# Patient Record
Sex: FEMALE | Race: WHITE | NOT HISPANIC OR LATINO | Employment: UNEMPLOYED | URBAN - NONMETROPOLITAN AREA
[De-identification: names, ages, dates, MRNs, and addresses within clinical notes are randomized per-mention and may not be internally consistent; named-entity substitution may affect disease eponyms.]

---

## 2017-01-13 ENCOUNTER — OFFICE VISIT (OUTPATIENT)
Dept: PULMONOLOGY | Facility: CLINIC | Age: 62
End: 2017-01-13

## 2017-01-13 VITALS
DIASTOLIC BLOOD PRESSURE: 92 MMHG | SYSTOLIC BLOOD PRESSURE: 130 MMHG | BODY MASS INDEX: 30.05 KG/M2 | HEIGHT: 66 IN | WEIGHT: 187 LBS | OXYGEN SATURATION: 96 % | HEART RATE: 81 BPM | TEMPERATURE: 98.6 F

## 2017-01-13 DIAGNOSIS — R91.8 PULMONARY NODULES: ICD-10-CM

## 2017-01-13 DIAGNOSIS — J30.1 SEASONAL ALLERGIC RHINITIS DUE TO POLLEN: Primary | ICD-10-CM

## 2017-01-13 DIAGNOSIS — I27.82 OTHER CHRONIC PULMONARY EMBOLISM WITHOUT ACUTE COR PULMONALE (HCC): ICD-10-CM

## 2017-01-13 PROCEDURE — 99213 OFFICE O/P EST LOW 20 MIN: CPT | Performed by: INTERNAL MEDICINE

## 2017-01-13 NOTE — PROGRESS NOTES
Interval history since last visit:    Recent hospitalizations: none     Investigations: No test ordered.     Have you had the Influenza Vaccine? no   Would you like to receive this Vaccine today? no    Have you had the Pneumonia Vaccine?  no  Would you like to receive this Vaccine today? no    Subjective    Eva Manus presents for the following Allergies and pulmonary nodule  .    History of Present Illness   No interval change in patients history. Shortness of breath and other symptoms remain at baseline.    Did not get vaccination and doesnot want to get any vaccination- even after my counseling.     Review of Systems   Constitutional: Negative for activity change, fatigue and unexpected weight change.   HENT: Negative for congestion, postnasal drip and rhinorrhea.    Respiratory: Negative for apnea, cough, chest tightness, shortness of breath and wheezing.    Cardiovascular: Negative for chest pain and palpitations.   Gastrointestinal: Negative for nausea.   Allergic/Immunologic: Negative for environmental allergies.   Psychiatric/Behavioral: Negative for agitation and confusion.       Active Problems:  Problem List Items Addressed This Visit        Cardiovascular and Mediastinum    Pulmonary embolism       Respiratory    Pulmonary nodules    Relevant Orders    CT Chest Without Contrast    Seasonal allergies - Primary          Past Medical History:  Past Medical History   Diagnosis Date   • Bilateral Pulmonary embolism    • Bilateral pulmonary embolism    • Deep vein thrombosis    • Hypertension    • Osteoarthritis    • Osteoporosis    • SVT (supraventricular tachycardia) 7/25/2016   • Varicose veins        Family History:  Family History   Problem Relation Age of Onset   • Cancer Father      lung   • Atrial fibrillation Mother    • Heart attack Sister    • Heart attack Brother        Social History:  Social History   Substance Use Topics   • Smoking status: Former Smoker     Packs/day: 1.00   • Smokeless  "tobacco: Not on file      Comment: 8 months   • Alcohol use No       Current Medications:  Current Outpatient Prescriptions   Medication Sig Dispense Refill   • albuterol (ACCUNEB) 1.25 MG/3ML nebulizer solution   1   • albuterol (PROVENTIL HFA;VENTOLIN HFA) 108 (90 BASE) MCG/ACT inhaler Inhale 2 puffs every 4 (four) hours as needed for wheezing.     • apixaban (ELIQUIS) 5 MG tablet tablet Take 1 tablet by mouth 2 (two) times a day. 60 tablet 2   • DOCQLACE 100 MG capsule   6   • docusate calcium (SURFAK) 240 MG capsule Take 240 mg by mouth 2 (two) times a day.     • fluticasone (FLONASE) 50 MCG/ACT nasal spray 2 sprays into each nostril daily. Administer 2 sprays in each nostril for each dose. 1 bottle 2   • lisinopril (PRINIVIL,ZESTRIL) 10 MG tablet Take 10 mg by mouth daily.     • metoprolol tartrate (LOPRESSOR) 25 MG tablet Take 1 tablet by mouth 2 (two) times a day. Takes 1&1/2 tabs twice daily 60 tablet 5   • ondansetron (ZOFRAN) 4 MG tablet        No current facility-administered medications for this visit.        Allergies:  No Known Allergies    Vitals:  Visit Vitals   • /92 (BP Location: Left arm, Patient Position: Sitting, Cuff Size: Adult)   • Pulse 81   • Temp 98.6 °F (37 °C) (Oral)   • Ht 66\" (167.6 cm)   • Wt 187 lb (84.8 kg)   • SpO2 96%   • BMI 30.18 kg/m2       Imaging:    Imaging Results (most recent)     None          Pulmonary Functions Testing Results:    No results found for: FEV1, FVC, WAF9WDN, TLC, DLCO    Results for orders placed or performed in visit on 09/28/16   Comprehensive Metabolic Panel   Result Value Ref Range    Glucose 161 (H) 70 - 110 mg/dL    BUN 10 7 - 21 mg/dL    Creatinine 0.66 0.43 - 1.29 mg/dL    Sodium 144 135 - 153 mmol/L    Potassium 3.5 3.5 - 5.3 mmol/L    Chloride 106 99 - 112 mmol/L    CO2 32.8 (H) 24.3 - 31.9 mmol/L    Calcium 9.4 7.7 - 10.0 mg/dL    Total Protein 6.8 6.0 - 8.0 g/dL    Albumin 4.00 3.40 - 4.80 g/dL    ALT (SGPT) 14 10 - 36 U/L    AST (SGOT) " 14 10 - 30 U/L    Alkaline Phosphatase 79 46 - 116 U/L    Total Bilirubin 0.4 0.2 - 1.8 mg/dL    eGFR Non African Amer 91 >60 mL/min/1.73    Globulin 2.8 gm/dL    A/G Ratio 1.4 (L) 1.5 - 2.5 g/dL    BUN/Creatinine Ratio 15.2 7.0 - 25.0    Anion Gap 5.2 3.6 - 11.2 mmol/L   CBC Auto Differential   Result Value Ref Range    WBC 6.53 4.50 - 12.50 10*3/mm3    RBC 4.75 4.20 - 5.40 10*6/mm3    Hemoglobin 13.7 12.0 - 16.0 g/dL    Hematocrit 42.0 37.0 - 47.0 %    MCV 88.4 80.0 - 94.0 fL    MCH 28.8 27.0 - 33.0 pg    MCHC 32.6 (L) 33.0 - 37.0 g/dL    RDW 12.9 11.5 - 14.5 %    RDW-SD 40.9 37.0 - 54.0 fl    MPV 10.9 (H) 6.0 - 10.0 fL    Platelets 173 130 - 400 10*3/mm3    Neutrophil % 58.4 30.0 - 70.0 %    Lymphocyte % 28.0 21.0 - 51.0 %    Monocyte % 7.2 0.0 - 10.0 %    Eosinophil % 5.7 (H) 0.0 - 5.0 %    Basophil % 0.5 0.0 - 2.0 %    Immature Grans % 0.2 0.0 - 0.5 %    Neutrophils, Absolute 3.82 1.40 - 6.50 10*3/mm3    Lymphocytes, Absolute 1.83 1.00 - 3.00 10*3/mm3    Monocytes, Absolute 0.47 0.10 - 0.90 10*3/mm3    Eosinophils, Absolute 0.37 0.00 - 0.70 10*3/mm3    Basophils, Absolute 0.03 0.00 - 0.30 10*3/mm3    Immature Grans, Absolute 0.01 0.00 - 0.03 10*3/mm3   Osmolality, Calculated   Result Value Ref Range    Osmolality Calc 289 273 - 305 mOsm/kg       Objective   Physical Exam   General- normal in appearance, not in any acute distress    HEENT- pupils equally reactive to light, normal in size, no scleral icterus    Neck-supple    Chest-respirations normal-on auscultation no wheezing no crackles    S1 and S2 normal    Abdomen-nontender nondistended bowel sounds positive    CNS-nonfocal    Extremities-no edema    Psychiatric-mood good, good eye contact, alert awake oriented    Assessment/Plan      1. Pulmonary nodules:  PET / Ct negative. Results shown and discussed with her. As she has history of PE with weight loss and has multiple nodules- I gave her an option of bronchoscopy but she wants to repeat a ct chest and  see if the changes are still present or not if they are then she might want to proceed with bronchoscopy as she doesn't like procedures. I did explain her the risks and benefits over 5 minutes of radiation.    Pulmonary embolism-not sure of the etiology.  Continue current treatment.  Following with hematology. management as per them     Seasonal allergies- in control continue flonase    Bilateral knee pain- better      ICD-10-CM ICD-9-CM   1. Seasonal allergic rhinitis due to pollen J30.1 477.0   2. Pulmonary nodules R91.8 793.19   3. Other chronic pulmonary embolism without acute cor pulmonale I27.82        Return in about 2 months (around 3/13/2017).

## 2017-01-13 NOTE — MR AVS SNAPSHOT
Eva Kaba   1/13/2017 1:20 PM   Office Visit    Dept Phone:  [phone]   Encounter #:  [id number]    Provider:  Addi Guillen MD   Department:  [facility]MON[facility]                Your Full Care Plan              Today's Medication Changes          These changes are accurate as of: 1/13/17  3:04 PM.  If you have any questions, ask your nurse or doctor.               Stop taking medication(s)listed here:     acetaminophen 325 MG tablet   Commonly known as:  TYLENOL   Stopped by:  Addi Guillen MD           acetaminophen-codeine 300-30 MG per tablet   Commonly known as:  TYLENOL #3   Stopped by:  Addi Guillen MD           amoxicillin-clavulanate 875-125 MG per tablet   Commonly known as:  AUGMENTIN   Stopped by:  Addi Guillen MD           azelastine 0.1 % nasal spray   Commonly known as:  ASTELIN   Stopped by:  Addi Guillen MD           azithromycin 250 MG tablet   Commonly known as:  ZITHROMAX   Stopped by:  Addi Guillen MD           cetirizine 10 MG tablet   Commonly known as:  zyrTEC   Stopped by:  Addi Guillen MD           HYDROcodone-acetaminophen 7.5-325 MG per tablet   Commonly known as:  NORCO   Stopped by:  Addi Guillen MD           HYDROCODONE-APAP-DIETARY PROD PO   Stopped by:  Addi Guillen MD           hydrOXYzine 25 MG tablet   Commonly known as:  ATARAX   Stopped by:  Addi Guillen MD           nitrofurantoin (macrocrystal-monohydrate) 100 MG capsule   Commonly known as:  MACROBID   Stopped by:  Addi Guillen MD                      Your Updated Medication List          This list is accurate as of: 1/13/17  3:04 PM.  Always use your most recent med list.                * albuterol 108 (90 BASE) MCG/ACT inhaler   Commonly known as:  PROVENTIL HFA;VENTOLIN HFA       * albuterol 1.25 MG/3ML nebulizer solution   Commonly known as:  ACCUNEB       apixaban 5 MG tablet tablet   Commonly known as:  ELIQUIS   Take 1 tablet by mouth 2  (two) times a day.       DOCQLACE 100 MG capsule   Generic drug:  docusate sodium       docusate calcium 240 MG capsule   Commonly known as:  SURFAK       fluticasone 50 MCG/ACT nasal spray   Commonly known as:  FLONASE   2 sprays into each nostril daily. Administer 2 sprays in each nostril for each dose.       lisinopril 10 MG tablet   Commonly known as:  PRINIVIL,ZESTRIL       metoprolol tartrate 25 MG tablet   Commonly known as:  LOPRESSOR   Take 1 tablet by mouth 2 (two) times a day. Takes 1&1/2 tabs twice daily       ondansetron 4 MG tablet   Commonly known as:  ZOFRAN       * Notice:  This list has 2 medication(s) that are the same as other medications prescribed for you. Read the directions carefully, and ask your doctor or other care provider to review them with you.            You Were Diagnosed With        Codes Comments    Seasonal allergic rhinitis due to pollen    -  Primary ICD-10-CM: J30.1  ICD-9-CM: 477.0     Pulmonary nodules     ICD-10-CM: R91.8  ICD-9-CM: 793.19     SVT (supraventricular tachycardia)     ICD-10-CM: I47.1  ICD-9-CM: 427.89     Other chronic pulmonary embolism without acute cor pulmonale     ICD-10-CM: I27.82       Instructions     None    Patient Instructions History      Upcoming Appointments     Visit Type Date Time Department    FOLLOW UP 2017  1:20 PM [facility]    FOLLOW UP 2017  3:30 PM [facility]    FOLLOW UP 3/14/2017  1:40 PM [facility]      Canva Signup     [facility] Canva allows you to send messages to your doctor, view your test results, renew your prescriptions, schedule appointments, and more. To sign up, go to Profile[employer]er and click on the Sign Up Now link in the New User? box. Enter your Canva Activation Code exactly as it appears below along with the last four digits of your Social Security Number and your Date of Birth () to complete the sign-up process. If you do not sign up before the expiration  "date, you must request a new code.    MyChart Activation Code: [email or web address]  Expires: 1/27/2017  3:04 PM    If you have questions, you can email [email or web address] or call [phone] to talk to our MyChart staff. Remember, MyChart is NOT to be used for urgent needs. For medical emergencies, dial 911.               Other Info from Your Visit           Your Appointments     Jan 18, 2017  3:30 PM EST   Follow Up with Eulalia Mims MD   [facility] ([place])    [address] [place] [id number] [phone]           Arrive 15 minutes prior to appointment.            Mar 14, 2017  1:40 PM EDT   Follow Up with RADHA Donohue   [facility]ULMON[facility]IT[facility] (--)    [address] [place] [id number] [phone]           Arrive 15 minutes prior to appointment.              Allergies     No Known Allergies      Reason for Visit     Allergies     pulmonary nodule           Vital Signs     Blood Pressure Pulse Temperature Height Weight Oxygen Saturation    130/92 (BP Location: Left arm, Patient Position: Sitting, Cuff Size: Adult) 81 98.6 °F (37 °C) (Oral) 66\" (167.6 cm) 187 lb (84.8 kg) 96%    Body Mass Index Smoking Status                30.18 kg/m2 Former Smoker          Problems and Diagnoses Noted     Blood clot to lung    Pulmonary nodules    Seasonal allergic reaction    SVT (supraventricular tachycardia)        "

## 2017-02-06 ENCOUNTER — HOSPITAL ENCOUNTER (OUTPATIENT)
Dept: CT IMAGING | Facility: HOSPITAL | Age: 62
Discharge: HOME | End: 2017-02-06
Attending: INTERNAL MEDICINE | Admitting: INTERNAL MEDICINE

## 2017-02-06 DIAGNOSIS — R91.8 PULMONARY NODULES: ICD-10-CM

## 2017-02-06 PROCEDURE — 71250 CT THORAX DX C-: CPT

## 2017-02-06 PROCEDURE — 71250 CT THORAX DX C-: CPT | Performed by: RADIOLOGY

## 2017-02-13 ENCOUNTER — OFFICE VISIT (OUTPATIENT)
Dept: ONCOLOGY | Facility: CLINIC | Age: 62
End: 2017-02-13

## 2017-02-13 ENCOUNTER — LAB (OUTPATIENT)
Dept: ONCOLOGY | Facility: CLINIC | Age: 62
End: 2017-02-13

## 2017-02-13 VITALS
DIASTOLIC BLOOD PRESSURE: 75 MMHG | OXYGEN SATURATION: 98 % | RESPIRATION RATE: 18 BRPM | HEART RATE: 77 BPM | SYSTOLIC BLOOD PRESSURE: 131 MMHG | TEMPERATURE: 98.3 F | WEIGHT: 188.7 LBS | BODY MASS INDEX: 30.46 KG/M2

## 2017-02-13 DIAGNOSIS — I47.1 SVT (SUPRAVENTRICULAR TACHYCARDIA) (HCC): ICD-10-CM

## 2017-02-13 DIAGNOSIS — I27.82 OTHER CHRONIC PULMONARY EMBOLISM WITHOUT ACUTE COR PULMONALE (HCC): Primary | ICD-10-CM

## 2017-02-13 DIAGNOSIS — R91.8 PULMONARY NODULES: ICD-10-CM

## 2017-02-13 DIAGNOSIS — I26.99 OTHER PULMONARY EMBOLISM WITHOUT ACUTE COR PULMONALE, UNSPECIFIED CHRONICITY (HCC): ICD-10-CM

## 2017-02-13 LAB
ALBUMIN SERPL-MCNC: 3.9 G/DL (ref 3.4–4.8)
ALBUMIN/GLOB SERPL: 1.5 G/DL (ref 1.5–2.5)
ALP SERPL-CCNC: 77 U/L (ref 46–116)
ALT SERPL W P-5'-P-CCNC: 20 U/L (ref 10–36)
ANION GAP SERPL CALCULATED.3IONS-SCNC: 4.4 MMOL/L (ref 3.6–11.2)
AST SERPL-CCNC: 22 U/L (ref 10–30)
BASOPHILS # BLD AUTO: 0.06 10*3/MM3 (ref 0–0.3)
BASOPHILS NFR BLD AUTO: 0.7 % (ref 0–2)
BILIRUB SERPL-MCNC: 0.3 MG/DL (ref 0.2–1.8)
BUN BLD-MCNC: 9 MG/DL (ref 7–21)
BUN/CREAT SERPL: 13.2 (ref 7–25)
CALCIUM SPEC-SCNC: 8.9 MG/DL (ref 7.7–10)
CHLORIDE SERPL-SCNC: 106 MMOL/L (ref 99–112)
CO2 SERPL-SCNC: 30.6 MMOL/L (ref 24.3–31.9)
CREAT BLD-MCNC: 0.68 MG/DL (ref 0.43–1.29)
DEPRECATED RDW RBC AUTO: 40.4 FL (ref 37–54)
EOSINOPHIL # BLD AUTO: 0.57 10*3/MM3 (ref 0–0.7)
EOSINOPHIL NFR BLD AUTO: 7.1 % (ref 0–5)
ERYTHROCYTE [DISTWIDTH] IN BLOOD BY AUTOMATED COUNT: 12.6 % (ref 11.5–14.5)
GFR SERPL CREATININE-BSD FRML MDRD: 88 ML/MIN/1.73
GLOBULIN UR ELPH-MCNC: 2.6 GM/DL
GLUCOSE BLD-MCNC: 113 MG/DL (ref 70–110)
HCT VFR BLD AUTO: 41.8 % (ref 37–47)
HGB BLD-MCNC: 13.4 G/DL (ref 12–16)
IMM GRANULOCYTES # BLD: 0.01 10*3/MM3 (ref 0–0.03)
IMM GRANULOCYTES NFR BLD: 0.1 % (ref 0–0.5)
LYMPHOCYTES # BLD AUTO: 2.33 10*3/MM3 (ref 1–3)
LYMPHOCYTES NFR BLD AUTO: 29 % (ref 21–51)
MCH RBC QN AUTO: 28.6 PG (ref 27–33)
MCHC RBC AUTO-ENTMCNC: 32.1 G/DL (ref 33–37)
MCV RBC AUTO: 89.1 FL (ref 80–94)
MONOCYTES # BLD AUTO: 0.69 10*3/MM3 (ref 0.1–0.9)
MONOCYTES NFR BLD AUTO: 8.6 % (ref 0–10)
NEUTROPHILS # BLD AUTO: 4.38 10*3/MM3 (ref 1.4–6.5)
NEUTROPHILS NFR BLD AUTO: 54.5 % (ref 30–70)
OSMOLALITY SERPL CALC.SUM OF ELEC: 280.8 MOSM/KG (ref 273–305)
PLATELET # BLD AUTO: 203 10*3/MM3 (ref 130–400)
PMV BLD AUTO: 11 FL (ref 6–10)
POTASSIUM BLD-SCNC: 4.1 MMOL/L (ref 3.5–5.3)
PROT SERPL-MCNC: 6.5 G/DL (ref 6–8)
RBC # BLD AUTO: 4.69 10*6/MM3 (ref 4.2–5.4)
SODIUM BLD-SCNC: 141 MMOL/L (ref 135–153)
WBC NRBC COR # BLD: 8.04 10*3/MM3 (ref 4.5–12.5)

## 2017-02-13 PROCEDURE — 80053 COMPREHEN METABOLIC PANEL: CPT | Performed by: INTERNAL MEDICINE

## 2017-02-13 PROCEDURE — 99214 OFFICE O/P EST MOD 30 MIN: CPT | Performed by: INTERNAL MEDICINE

## 2017-02-13 PROCEDURE — 85025 COMPLETE CBC W/AUTO DIFF WBC: CPT | Performed by: INTERNAL MEDICINE

## 2017-02-13 NOTE — PROGRESS NOTES
Eva Kaba  [id number]  1955 2/13/2017      Reason for Follow up:   Bilateral pulmonary embolism   Pulmonary nodules    Requesting Physician:    Dr. River Velasquez     Chief Complaint:   Lower extremity muscle cramps      History of Present Illness   Ms. Kaba is a very pleasant 61 year old   female who presents in followup appointment for further management of bilateral pulmonary embolism and pulmonary nodules.      She originally presented to her primary care physicians office in 2/2016 with complaints of pressure like chest pain and palpitation. At her PCP' s office she was found to have a HR of 190 and was subsequently transferred to the emergency room for further evaluation. She also received adenosine in the ambulance with improvement in her heart ra te. Prior to her visit she had been having increasing palpitations. She denied of any fevers, dyspnea, lower extremity pain, or pleuritic pain at the time. Upon further evaluation in the emergency department she underwent a CT PE protocol which was significant for bilateral pulmonary emboli and was subsequently started on anticoagulation and currently remain on Eliquis 5mg oral BID. She does not carry any previous history of thrombosis or abnormal bleeding and denied of any prolonged immobilization and reported that she remains quite active. She also denied of any trauma or falls. No history of tobacco or hormonal use. She has 7 children and has suffered from 1 miscarriage in her first trimester. She otherwise denied of any family history of miscarriages, bleeding or clotting disorders. She was hospitalized for recurrent SVT in which she had a CT scan performed showing pulmonary nodules that was concerning for possible metastases versus infectious process. She was to be scheduled for colonoscopy and mammogram and has been following with Dr. Almanzar and now with Dr. Guillen for her lung nodules and for possible bronchoscopy and with Dr. Hall for  colonoscopy.     During her visit with me today she denies of any significant complaints with the exception of pain related to her bursitis and recently underwent a steroid injection. She is tolerating anticoagulation well without any abnormal or spontaneous bleeding. Dr. Guillen had previously wanted her to undergo a bronchoscopy for further evaluation of lung nodules however she declined this procedure and preferred to follow imaging. She has not yet undergone colonoscopy as she has been dealing with family issues as her mother most recently passed away.       No Known Allergies  No known drug allergies       Past Medical History   Diagnosis Date   • Bilateral Pulmonary embolism    • Bilateral pulmonary embolism    • Deep vein thrombosis    • Hypertension    • Osteoarthritis    • Osteoporosis    • SVT (supraventricular tachycardia) 7/25/2016   • Varicose veins        Past Surgical History   Procedure Laterality Date   • Dilatation and curettage       first trimester miscarriage       History     Social History   • Marital status:      Spouse name: N/A   • Number of children: N/A   • Years of education: N/A     Occupational History   • Not on file.     Social History Main Topics   • Smoking status: Former Smoker     Packs/day: 1.00   • Smokeless tobacco: Not on file      Comment: 8 months   • Alcohol use: No   • Drug use: No   • Sexual activity: Not on file     Other Topics Concern   • Not on file     Social History Narrative   • She lives in [place], [place] with her . She was a previous smoker for 8 months <1ppd in the 1970s. She denies of any alcohol or illicit drug use. She currently works as a cook at [employer]. She denies of any hormone use.        Family History   Problem Relation Age of Onset   • Cancer Father      lung   • Atrial fibrillation Mother    • Heart attack Sister    • Heart attack Brother          Medications:  Currently on Eliquis      Review of  Systems  Constitutional: No fever, chills, night sweats or weight loss.   HEENT:  No headaches, vision changes or hearing changes, no sinus drainage.   Cardiovascular:  No palpitations, chest pain, syncopal episodes or edema.   Pulmonary:  No shortness of breath, no hemoptysis, no cough.   Gastrointestinal:  No nausea or vomiting.  No constipation, no diarrhea, no melena or hematochezia   Genitourinary:  No hematuria, or changes in urination.   Musculoskeletal:  + arthritic pain  Skin: No rashes or pruritus.   Endocrine:  No hot flashes or chills   Hematologic:  No easy bleeding, +bruising since being on Eliquis  Immunologic:  No allergies or frequent infections.   Neurologic: No numbness, tingling, or weakness.   Psychiatric:  No anxiety or depression.         Physical Exam  Vital Signs: These were reviewed and listed as per patient’s electronic medical chart  Vitals:    17 1433   BP: 131/75   Pulse: 77   Resp: 18   Temp: 98.3 °F (36.8 °C)   SpO2: 98%     General: Awake, alert and oriented, in no distress   HEENT: Head is atraumatic, normocephalic, extraocular movements full, oropharynx clear, no scleral icterus, pink moist mucous membranes   Neck: supple, no jvd, lymphadenopathy or masses   Cardiovascular: regular rate and rhythm without murmurs, rubs or gallops   Pulmonary: clear to auscultation bilaterally, no wheezing  Abdomen: soft, non-tender, non-distended, normal active bowel sounds present, no organomegaly   Extremities: No clubbing, cyanosis or edema   Lymph: No cervical, supraclavicular adenopathy   Neurologic: Mental status as above, alert, awake and oriented, grossly non-focal exam         Labs / Studies:      IMAGIN16: CT PE Protocol:   The pulmonary arteries were densely opacified. The distribution of the contrast in the pulmonary arteries was remarkable for a filling defect in the right lower lobe pulmonary arteries consistent with thrombus. It is fairly small in size. The left  pulmonary arteries also show a small area of thrombus in the lingular segment of the upper lobe. The aorta is normal in caliber. IMPRESSION- Bilateral pulmonary emboli in the lungs.     02/19/16: LE Duplex bilateral: There is patent spontan eous flow from the common femoral vein through the posterior tibial veins. There was no internal clot or area of noncompressibility. Normal augmentation was elicited where applicable. IMPRESSION- No DVT in the lower extremities on today's exam.     02/19/16: Carotid duplex: IMPRESSION- No hemodynamically significant stenosis     02/27/16: CT Abdomen/Pelvis:  1. LARGE ANTERIOR ABDOMINAL WALL HERNIA. 2. ABUNDANT STOOL IN THE DISTAL COLON. 3. SCATTERED RIGHT LOWER LOBE PULMONARY NODULES OF UNCERTAIN ETIOLOGY. THIS COULD BE METASTATIC OR POTENTIALLY INFECTIOUS IN ETIOLOGY.      06/26/16: CT CHEST PULMONARY EMBOLISM WITH CONTRAST: On today's exam the pulmonary arteries enhance homogeneously. The clots seen on the previous CT and February have resolved. The aorta is normal in caliber. There continues to be multiple pulmonary parenchymal lung nodules in the lungs. These are predominantly in the 1cm size range. Radiographically they are stable in comparing with the earlier CT. IMPRESSION: No residual pulmonary emboli. There continue to be several noncalcified pulmonary parenchymal lung nodules in the right lung which are stable.      09/28/16: NM PET SKULL BASE TO MID THIGH: The CT portion of the exam shows some small pulmonary parenchymal lung nodules which have been relatively stable in comparing with earlier CT scans. These are most prominent in the right lung base. The largest nodule measures 11 mm. All the lung nodules are metabolically inactive. There was no glucose uptake in any of these lungnodules. There were no enlarged lymph nodes or areas of hypermetabolic activity in the mediastinum or supraclavicular areas. Below the diaphragm, the distribution of the fluorodeoxyglucose is  physiologic. IMPRESSION: The lung nodules in the lung bases are metabolically inactive with no activity above background.    02/06/17: CT CHEST WO CONTRAST: There is no mediastinal or hilar lymph node enlargement. Parenchymal nodules are again noted, primarily in the right lower lobe. These are stable from the previous study. IMPRESSION: Multiple parenchymal nodules, especially in the right lower lobe similar to the previous exam.           Assessment/Plan   Ms. Kaba is a very pleasant 61 year old   female who presents in followup appointment after hospital discharge at the request of Dr. Velasquez for further evaluation of bilateral pulmonary embolism and for hypercoagulable workup.      1. Bilateral Pulmonary Embolisms   She currently remains on Eliquis for anticoagulation and has been tolerating this well without any bleeding episodes. At this time patient' s pulmonary embolism appears to be unprovoked and therefore I did obtain a hypercoagulable workup to further evaluate which was negative. Patient should be on anticoagulation for at least a minimum of 6 months, however even though hypercoagulable workup was negative, her bilateral pulmonary emboli appear to have been unprovoked and therefore for this reason I would recommend life long anticoagulation as it is possible that she could have a life threatening recurrence. She understands the risks and side effects of anticoagulation, including that she could have a light threatening bleed while on anticoagulation however she is agreeable to the current plan.     2. Pulmonary Nodules   She is currently following with Dr. Guillen for possible bronchoscopy however has declined this and preferred to be followed with imaging. Repeat CT PE did not show change in pulmonary nodules with resolution of pulmonary emboli and PET scan along with recent repeat CT Chest again showed stability in nodules. She reports that she did undergo a mammogram which was negative. She  has been unable to undergo colonoscopy due to anticoagulation. I can transition the patient from Eliquis to SC Lovenox temporarily if patient's anticoagulation regimen hinders patient from receiving procedures or biopsies that are deemed necessary which I have discussed with her. She also has not returned due to the recent passing of her mother.      I will have  Ms. Kaba return in follow up appointment in six months. She understands that should  she have any questions or concerns prior to her  appointment she should give us a call at any time  and I would be happy to see her sooner. It was a pleasure to see Ms. Kaba in clinic today, thank you for allowing me to participate in the care of this patient.     I spent 25 minutes in regards to this patient's care today. More than 18 minutes of the time was spent in direct interaction with the patient discussing the above problems and her imaging results.         Eulalia Mims MD  02/13/17  2:35 PM

## 2017-03-15 ENCOUNTER — OFFICE VISIT (OUTPATIENT)
Dept: PULMONOLOGY | Facility: CLINIC | Age: 62
End: 2017-03-15

## 2017-03-15 VITALS
DIASTOLIC BLOOD PRESSURE: 75 MMHG | HEIGHT: 66 IN | HEART RATE: 100 BPM | BODY MASS INDEX: 30.22 KG/M2 | OXYGEN SATURATION: 97 % | WEIGHT: 188 LBS | SYSTOLIC BLOOD PRESSURE: 132 MMHG | TEMPERATURE: 98.1 F

## 2017-03-15 DIAGNOSIS — I26.99 OTHER PULMONARY EMBOLISM WITHOUT ACUTE COR PULMONALE, UNSPECIFIED CHRONICITY (HCC): ICD-10-CM

## 2017-03-15 DIAGNOSIS — R91.8 PULMONARY NODULES: ICD-10-CM

## 2017-03-15 DIAGNOSIS — R93.89 ABNORMAL CT SCAN, CHEST: Primary | ICD-10-CM

## 2017-03-15 DIAGNOSIS — J30.1 SEASONAL ALLERGIC RHINITIS DUE TO POLLEN: ICD-10-CM

## 2017-03-15 PROCEDURE — 99214 OFFICE O/P EST MOD 30 MIN: CPT | Performed by: INTERNAL MEDICINE

## 2017-03-15 NOTE — PROGRESS NOTES
Interval history since last visit:    Recent hospitalizations: none     Investigations: CT scan complete.     Have you had the Influenza Vaccine? no   Would you like to receive this Vaccine today? no    Have you had the Pneumonia Vaccine?  no  Would you like to receive this Vaccine today? no    Subjective    [place] Manus presents for the following pulmonary nodule  .    History of Present Illness     Patient is here for a follow-up visit.  Shortness of breath remains at baseline.  No significant history of weight loss night sweats.    CT scan is done.  Results reviewed with the patient.  Images were shown.  No major changes in the pulmonary nodules    Review of Systems   Constitutional: Negative for activity change, fatigue and unexpected weight change.   HENT: Negative for congestion, postnasal drip and rhinorrhea.    Respiratory: Negative for apnea, cough, chest tightness, shortness of breath and wheezing.    Cardiovascular: Negative for chest pain and palpitations.   Gastrointestinal: Negative for nausea.   Allergic/Immunologic: Negative for environmental allergies.   Psychiatric/Behavioral: Negative for agitation and confusion.       Active Problems:  Problem List Items Addressed This Visit        Cardiovascular and Mediastinum    Pulmonary embolism       Respiratory    Pulmonary nodules    Seasonal allergies      Other Visit Diagnoses     Abnormal CT scan, chest    -  Primary    Relevant Orders    CT Chest Without Contrast          Past Medical History:  Past Medical History   Diagnosis Date   • Bilateral Pulmonary embolism    • Bilateral pulmonary embolism    • Deep vein thrombosis    • Hypertension    • Osteoarthritis    • Osteoporosis    • SVT (supraventricular tachycardia) 7/25/2016   • Varicose veins        Family History:  Family History   Problem Relation Age of Onset   • Cancer Father      lung   • Atrial fibrillation Mother    • Heart attack Sister    • Heart attack Brother        Social History:  Social  "History   Substance Use Topics   • Smoking status: Former Smoker     Packs/day: 1.00   • Smokeless tobacco: Not on file      Comment: 8 months   • Alcohol use No       Current Medications:  Current Outpatient Prescriptions   Medication Sig Dispense Refill   • albuterol (ACCUNEB) 1.25 MG/3ML nebulizer solution   1   • albuterol (PROVENTIL HFA;VENTOLIN HFA) 108 (90 BASE) MCG/ACT inhaler Inhale 2 puffs every 4 (four) hours as needed for wheezing.     • apixaban (ELIQUIS) 5 MG tablet tablet Take 1 tablet by mouth 2 (two) times a day. 60 tablet 2   • DOCQLACE 100 MG capsule   6   • docusate calcium (SURFAK) 240 MG capsule Take 240 mg by mouth 2 (two) times a day.     • fluticasone (FLONASE) 50 MCG/ACT nasal spray 2 sprays into each nostril daily. Administer 2 sprays in each nostril for each dose. 1 bottle 2   • lisinopril (PRINIVIL,ZESTRIL) 10 MG tablet Take 10 mg by mouth daily.     • metoprolol tartrate (LOPRESSOR) 25 MG tablet Take 1 tablet by mouth 2 (two) times a day. Takes 1&1/2 tabs twice daily 60 tablet 5   • ondansetron (ZOFRAN) 4 MG tablet        No current facility-administered medications for this visit.        Allergies:  No Known Allergies    Vitals:  Visit Vitals   • /75 (BP Location: Left arm, Patient Position: Sitting, Cuff Size: Adult)   • Pulse 100   • Temp 98.1 °F (36.7 °C) (Oral)   • Ht 66\" (167.6 cm)   • Wt 188 lb (85.3 kg)   • SpO2 97%   • BMI 30.34 kg/m2       Imaging:    Imaging Results (most recent)     None          Pulmonary Functions Testing Results:    No results found for: FEV1, FVC, HET2NMY, TLC, DLCO    Results for orders placed or performed in visit on 02/13/17   Comprehensive Metabolic Panel   Result Value Ref Range    Glucose 113 (H) 70 - 110 mg/dL    BUN 9 7 - 21 mg/dL    Creatinine 0.68 0.43 - 1.29 mg/dL    Sodium 141 135 - 153 mmol/L    Potassium 4.1 3.5 - 5.3 mmol/L    Chloride 106 99 - 112 mmol/L    CO2 30.6 24.3 - 31.9 mmol/L    Calcium 8.9 7.7 - 10.0 mg/dL    Total Protein " 6.5 6.0 - 8.0 g/dL    Albumin 3.90 3.40 - 4.80 g/dL    ALT (SGPT) 20 10 - 36 U/L    AST (SGOT) 22 10 - 30 U/L    Alkaline Phosphatase 77 46 - 116 U/L    Total Bilirubin 0.3 0.2 - 1.8 mg/dL    eGFR Non African Amer 88 >60 mL/min/1.73    Globulin 2.6 gm/dL    A/G Ratio 1.5 1.5 - 2.5 g/dL    BUN/Creatinine Ratio 13.2 7.0 - 25.0    Anion Gap 4.4 3.6 - 11.2 mmol/L   CBC Auto Differential   Result Value Ref Range    WBC 8.04 4.50 - 12.50 10*3/mm3    RBC 4.69 4.20 - 5.40 10*6/mm3    Hemoglobin 13.4 12.0 - 16.0 g/dL    Hematocrit 41.8 37.0 - 47.0 %    MCV 89.1 80.0 - 94.0 fL    MCH 28.6 27.0 - 33.0 pg    MCHC 32.1 (L) 33.0 - 37.0 g/dL    RDW 12.6 11.5 - 14.5 %    RDW-SD 40.4 37.0 - 54.0 fl    MPV 11.0 (H) 6.0 - 10.0 fL    Platelets 203 130 - 400 10*3/mm3    Neutrophil % 54.5 30.0 - 70.0 %    Lymphocyte % 29.0 21.0 - 51.0 %    Monocyte % 8.6 0.0 - 10.0 %    Eosinophil % 7.1 (H) 0.0 - 5.0 %    Basophil % 0.7 0.0 - 2.0 %    Immature Grans % 0.1 0.0 - 0.5 %    Neutrophils, Absolute 4.38 1.40 - 6.50 10*3/mm3    Lymphocytes, Absolute 2.33 1.00 - 3.00 10*3/mm3    Monocytes, Absolute 0.69 0.10 - 0.90 10*3/mm3    Eosinophils, Absolute 0.57 0.00 - 0.70 10*3/mm3    Basophils, Absolute 0.06 0.00 - 0.30 10*3/mm3    Immature Grans, Absolute 0.01 0.00 - 0.03 10*3/mm3   Osmolality, Calculated   Result Value Ref Range    Osmolality Calc 280.8 273.0 - 305.0 mOsm/kg       Objective   Physical Exam   General- normal in appearance, not in any acute distress    HEENT- pupils equally reactive to light, normal in size, no scleral icterus    Neck-supple    Chest-respirations normal-on auscultation no wheezing no crackles    S1 and S2 normal    Abdomen-nontender nondistended bowel sounds positive    CNS-nonfocal    Extremities-no edema    Psychiatric-mood good, good eye contact, alert awake oriented    Assessment/Plan        1. Pulmonary nodules:  PET / Ct negative.  In the past I gave her an option to do bronchoscopy and a cane as patient has  history of weight loss and pulmonary embolism I gave her the option of going for a bronchoscopy and biopsy and brushings and bronchoalveolar lavages for tissue diagnosis for this pulmonary nodules.  Patient again chose to repeat the scan and does not want a bronchoscopy.  I explained her that these nodules could be carcinomatous.  She understood the risk of going through CT scans and she understands that without tissue diagnosis will not have a confirmatory diagnosis.  WANTS TO REPEAT CT CHEST - 6 months    Pulmonary embolism-continue management as per hematology.  Continue current treatment.    Seasonal allergies-in control.  Not using Flonase.    Bilateral knee pain- better      ICD-10-CM ICD-9-CM   1. Abnormal CT scan, chest R93.8 793.2   2. Seasonal allergic rhinitis due to pollen J30.1 477.0   3. Pulmonary nodules R91.8 793.19   4. Other pulmonary embolism without acute cor pulmonale, unspecified chronicity I26.99 415.19       Return in about 6 months (around 9/15/2017).

## 2017-06-06 ENCOUNTER — HOSPITAL ENCOUNTER (EMERGENCY)
Facility: HOSPITAL | Age: 62
Discharge: HOME | End: 2017-06-06
Attending: EMERGENCY MEDICINE | Admitting: EMERGENCY MEDICINE

## 2017-06-06 ENCOUNTER — APPOINTMENT (OUTPATIENT)
Dept: ULTRASOUND IMAGING | Facility: HOSPITAL | Age: 62
End: 2017-06-06

## 2017-06-06 VITALS
HEIGHT: 66 IN | OXYGEN SATURATION: 97 % | RESPIRATION RATE: 16 BRPM | HEART RATE: 98 BPM | SYSTOLIC BLOOD PRESSURE: 121 MMHG | DIASTOLIC BLOOD PRESSURE: 57 MMHG | TEMPERATURE: 99.4 F | BODY MASS INDEX: 28.93 KG/M2 | WEIGHT: 180 LBS

## 2017-06-06 DIAGNOSIS — R11.2 NON-INTRACTABLE VOMITING WITH NAUSEA, UNSPECIFIED VOMITING TYPE: Primary | ICD-10-CM

## 2017-06-06 LAB
ALBUMIN SERPL-MCNC: 4 G/DL (ref 3.4–4.8)
ALBUMIN/GLOB SERPL: 1.3 G/DL (ref 1.5–2.5)
ALP SERPL-CCNC: 137 U/L (ref 35–104)
ALT SERPL W P-5'-P-CCNC: 56 U/L (ref 10–36)
AMYLASE SERPL-CCNC: 31 U/L (ref 28–100)
ANION GAP SERPL CALCULATED.3IONS-SCNC: 6.5 MMOL/L (ref 3.6–11.2)
AST SERPL-CCNC: 79 U/L (ref 10–30)
BACTERIA UR QL AUTO: ABNORMAL /HPF
BASOPHILS # BLD AUTO: 0.03 10*3/MM3 (ref 0–0.3)
BASOPHILS NFR BLD AUTO: 0.9 % (ref 0–2)
BILIRUB SERPL-MCNC: 0.8 MG/DL (ref 0.2–1.8)
BILIRUB UR QL STRIP: ABNORMAL
BUN BLD-MCNC: 14 MG/DL (ref 7–21)
BUN/CREAT SERPL: 14.1 (ref 7–25)
CALCIUM SPEC-SCNC: 9.1 MG/DL (ref 7.7–10)
CHLORIDE SERPL-SCNC: 102 MMOL/L (ref 99–112)
CLARITY UR: CLEAR
CO2 SERPL-SCNC: 30.5 MMOL/L (ref 24.3–31.9)
COLOR UR: ABNORMAL
CREAT BLD-MCNC: 0.99 MG/DL (ref 0.43–1.29)
DEPRECATED RDW RBC AUTO: 42.2 FL (ref 37–54)
EOSINOPHIL # BLD AUTO: 0 10*3/MM3 (ref 0–0.7)
EOSINOPHIL NFR BLD AUTO: 0 % (ref 0–5)
ERYTHROCYTE [DISTWIDTH] IN BLOOD BY AUTOMATED COUNT: 13.2 % (ref 11.5–14.5)
GFR SERPL CREATININE-BSD FRML MDRD: 57 ML/MIN/1.73
GLOBULIN UR ELPH-MCNC: 3.2 GM/DL
GLUCOSE BLD-MCNC: 129 MG/DL (ref 70–110)
GLUCOSE UR STRIP-MCNC: NEGATIVE MG/DL
HAV IGM SERPL QL IA: NORMAL
HBV CORE IGM SERPL QL IA: NORMAL
HBV SURFACE AG SERPL QL IA: NORMAL
HCT VFR BLD AUTO: 47.5 % (ref 37–47)
HCV AB SER DONR QL: NORMAL
HGB BLD-MCNC: 15.1 G/DL (ref 12–16)
HGB UR QL STRIP.AUTO: NEGATIVE
HYALINE CASTS UR QL AUTO: ABNORMAL /LPF
IMM GRANULOCYTES # BLD: 0.02 10*3/MM3 (ref 0–0.03)
IMM GRANULOCYTES NFR BLD: 0.6 % (ref 0–0.5)
KETONES UR QL STRIP: ABNORMAL
LEUKOCYTE ESTERASE UR QL STRIP.AUTO: ABNORMAL
LIPASE SERPL-CCNC: 42 U/L (ref 13–60)
LYMPHOCYTES # BLD AUTO: 0.31 10*3/MM3 (ref 1–3)
LYMPHOCYTES NFR BLD AUTO: 9.3 % (ref 21–51)
MCH RBC QN AUTO: 27.9 PG (ref 27–33)
MCHC RBC AUTO-ENTMCNC: 31.8 G/DL (ref 33–37)
MCV RBC AUTO: 87.8 FL (ref 80–94)
MONOCYTES # BLD AUTO: 0.18 10*3/MM3 (ref 0.1–0.9)
MONOCYTES NFR BLD AUTO: 5.4 % (ref 0–10)
NEUTROPHILS # BLD AUTO: 2.79 10*3/MM3 (ref 1.4–6.5)
NEUTROPHILS NFR BLD AUTO: 83.8 % (ref 30–70)
NITRITE UR QL STRIP: NEGATIVE
OSMOLALITY SERPL CALC.SUM OF ELEC: 279.7 MOSM/KG (ref 273–305)
PH UR STRIP.AUTO: 6 [PH] (ref 5–8)
PLATELET # BLD AUTO: 78 10*3/MM3 (ref 130–400)
PMV BLD AUTO: 11.4 FL (ref 6–10)
POTASSIUM BLD-SCNC: 4.4 MMOL/L (ref 3.5–5.3)
PROT SERPL-MCNC: 7.2 G/DL (ref 6–8)
PROT UR QL STRIP: ABNORMAL
RBC # BLD AUTO: 5.41 10*6/MM3 (ref 4.2–5.4)
RBC # UR: ABNORMAL /HPF
REF LAB TEST METHOD: ABNORMAL
SODIUM BLD-SCNC: 139 MMOL/L (ref 135–153)
SP GR UR STRIP: >=1.03 (ref 1–1.03)
SQUAMOUS #/AREA URNS HPF: ABNORMAL /HPF
UROBILINOGEN UR QL STRIP: ABNORMAL
WBC NRBC COR # BLD: 3.33 10*3/MM3 (ref 4.5–12.5)
WBC UR QL AUTO: ABNORMAL /HPF

## 2017-06-06 PROCEDURE — 76705 ECHO EXAM OF ABDOMEN: CPT

## 2017-06-06 PROCEDURE — 80074 ACUTE HEPATITIS PANEL: CPT | Performed by: PHYSICIAN ASSISTANT

## 2017-06-06 PROCEDURE — 87086 URINE CULTURE/COLONY COUNT: CPT | Performed by: PHYSICIAN ASSISTANT

## 2017-06-06 PROCEDURE — 96361 HYDRATE IV INFUSION ADD-ON: CPT

## 2017-06-06 PROCEDURE — 83690 ASSAY OF LIPASE: CPT | Performed by: PHYSICIAN ASSISTANT

## 2017-06-06 PROCEDURE — 80053 COMPREHEN METABOLIC PANEL: CPT | Performed by: PHYSICIAN ASSISTANT

## 2017-06-06 PROCEDURE — 96374 THER/PROPH/DIAG INJ IV PUSH: CPT

## 2017-06-06 PROCEDURE — 81001 URINALYSIS AUTO W/SCOPE: CPT | Performed by: PHYSICIAN ASSISTANT

## 2017-06-06 PROCEDURE — 25010000002 PROMETHAZINE PER 50 MG: Performed by: PHYSICIAN ASSISTANT

## 2017-06-06 PROCEDURE — 96375 TX/PRO/DX INJ NEW DRUG ADDON: CPT

## 2017-06-06 PROCEDURE — 85025 COMPLETE CBC W/AUTO DIFF WBC: CPT | Performed by: PHYSICIAN ASSISTANT

## 2017-06-06 PROCEDURE — 99285 EMERGENCY DEPT VISIT HI MDM: CPT

## 2017-06-06 PROCEDURE — 82150 ASSAY OF AMYLASE: CPT | Performed by: PHYSICIAN ASSISTANT

## 2017-06-06 PROCEDURE — 25010000002 ONDANSETRON PER 1 MG: Performed by: PHYSICIAN ASSISTANT

## 2017-06-06 PROCEDURE — 76705 ECHO EXAM OF ABDOMEN: CPT | Performed by: RADIOLOGY

## 2017-06-06 RX ORDER — PROMETHAZINE HYDROCHLORIDE 12.5 MG/1
12.5 TABLET ORAL EVERY 8 HOURS PRN
Qty: 15 TABLET | Refills: 0 | Status: SHIPPED | OUTPATIENT
Start: 2017-06-06 | End: 2017-06-11

## 2017-06-06 RX ORDER — AMOXICILLIN AND CLAVULANATE POTASSIUM 875; 125 MG/1; MG/1
1 TABLET, FILM COATED ORAL 2 TIMES DAILY
COMMUNITY
End: 2017-09-12

## 2017-06-06 RX ORDER — ACETAMINOPHEN 325 MG/1
1000 TABLET ORAL ONCE
Status: COMPLETED | OUTPATIENT
Start: 2017-06-06 | End: 2017-06-06

## 2017-06-06 RX ORDER — ONDANSETRON 2 MG/ML
4 INJECTION INTRAMUSCULAR; INTRAVENOUS ONCE
Status: COMPLETED | OUTPATIENT
Start: 2017-06-06 | End: 2017-06-06

## 2017-06-06 RX ORDER — SODIUM CHLORIDE 0.9 % (FLUSH) 0.9 %
10 SYRINGE (ML) INJECTION AS NEEDED
Status: DISCONTINUED | OUTPATIENT
Start: 2017-06-06 | End: 2017-06-06 | Stop reason: HOSPADM

## 2017-06-06 RX ADMIN — ACETAMINOPHEN 975 MG: 325 TABLET ORAL at 10:33

## 2017-06-06 RX ADMIN — PROMETHAZINE HYDROCHLORIDE 12.5 MG: 25 INJECTION INTRAMUSCULAR; INTRAVENOUS at 10:34

## 2017-06-06 RX ADMIN — SODIUM CHLORIDE 1000 ML: 9 INJECTION, SOLUTION INTRAVENOUS at 09:01

## 2017-06-06 RX ADMIN — ONDANSETRON 4 MG: 2 INJECTION, SOLUTION INTRAMUSCULAR; INTRAVENOUS at 09:02

## 2017-06-06 NOTE — DISCHARGE INSTRUCTIONS
Please take promethazine for nausea alleviation. Please slowly increase your oral intake as nausea subsides. Take tylenol for alleviation of your fever. Follow up with your PCP in 5 days for re-evaluation as well as evaluation of your hematuria. Return to ER if symptoms worsen.    Hypertension  Hypertension, commonly called high blood pressure, is when the force of blood pumping through your arteries is too strong. Your arteries are the blood vessels that carry blood from your heart throughout your body. A blood pressure reading consists of a higher number over a lower number, such as 110/72. The higher number (systolic) is the pressure inside your arteries when your heart pumps. The lower number (diastolic) is the pressure inside your arteries when your heart relaxes. Ideally you want your blood pressure below 120/80.  Hypertension forces your heart to work harder to pump blood. Your arteries may become narrow or stiff. Having untreated or uncontrolled hypertension can cause heart attack, stroke, kidney disease, and other problems.  RISK FACTORS  Some risk factors for high blood pressure are controllable. Others are not.   Risk factors you cannot control include:   · Race. You may be at higher risk if you are .  · Age. Risk increases with age.  · Gender. Men are at higher risk than women before age 45 years. After age 65, women are at higher risk than men.  Risk factors you can control include:  · Not getting enough exercise or physical activity.  · Being overweight.  · Getting too much fat, sugar, calories, or salt in your diet.  · Drinking too much alcohol.  SIGNS AND SYMPTOMS  Hypertension does not usually cause signs or symptoms. Extremely high blood pressure (hypertensive crisis) may cause headache, anxiety, shortness of breath, and nosebleed.  DIAGNOSIS  To check if you have hypertension, your health care provider will measure your blood pressure while you are seated, with your arm held at the  level of your heart. It should be measured at least twice using the same arm. Certain conditions can cause a difference in blood pressure between your right and left arms. A blood pressure reading that is higher than normal on one occasion does not mean that you need treatment. If it is not clear whether you have high blood pressure, you may be asked to return on a different day to have your blood pressure checked again. Or, you may be asked to monitor your blood pressure at home for 1 or more weeks.  TREATMENT  Treating high blood pressure includes making lifestyle changes and possibly taking medicine. Living a healthy lifestyle can help lower high blood pressure. You may need to change some of your habits.  Lifestyle changes may include:  · Following the DASH diet. This diet is high in fruits, vegetables, and whole grains. It is low in salt, red meat, and added sugars.  · Keep your sodium intake below 2,300 mg per day.  · Getting at least 30-45 minutes of aerobic exercise at least 4 times per week.  · Losing weight if necessary.  · Not smoking.  · Limiting alcoholic beverages.  · Learning ways to reduce stress.  Your health care provider may prescribe medicine if lifestyle changes are not enough to get your blood pressure under control, and if one of the following is true:  · You are 18-59 years of age and your systolic blood pressure is above 140.  · You are 60 years of age or older, and your systolic blood pressure is above 150.  · Your diastolic blood pressure is above 90.  · You have diabetes, and your systolic blood pressure is over 140 or your diastolic blood pressure is over 90.  · You have kidney disease and your blood pressure is above 140/90.  · You have heart disease and your blood pressure is above 140/90.  Your personal target blood pressure may vary depending on your medical conditions, your age, and other factors.  HOME CARE INSTRUCTIONS  · Have your blood pressure rechecked as directed by your  health care provider.    · Take medicines only as directed by your health care provider. Follow the directions carefully. Blood pressure medicines must be taken as prescribed. The medicine does not work as well when you skip doses. Skipping doses also puts you at risk for problems.  · Do not smoke.    · Monitor your blood pressure at home as directed by your health care provider.   SEEK MEDICAL CARE IF:   · You think you are having a reaction to medicines taken.  · You have recurrent headaches or feel dizzy.  · You have swelling in your ankles.  · You have trouble with your vision.  SEEK IMMEDIATE MEDICAL CARE IF:  · You develop a severe headache or confusion.  · You have unusual weakness, numbness, or feel faint.  · You have severe chest or abdominal pain.  · You vomit repeatedly.  · You have trouble breathing.  MAKE SURE YOU:   · Understand these instructions.  · Will watch your condition.  · Will get help right away if you are not doing well or get worse.     This information is not intended to replace advice given to you by your health care provider. Make sure you discuss any questions you have with your health care provider.     Document Released: 12/18/2006 Document Revised: 05/03/2016 Document Reviewed: 10/10/2014  [employer] Patient Education ©2017 [employer]

## 2017-06-06 NOTE — ED PROVIDER NOTES
Subjective   HPI Comments: This is a 62-year-old female patient who presents to the ER this morning with chief complaints of nausea, vomiting, fever, and symptoms of a urinary tract infection.  The patient says that on Saturday, 3 days ago, she began experiencing some mild nausea as well as vomiting and a low-grade fever that was subjective.  She saw her primary care provider Dolores Troy nurse practitioner yesterday and was diagnosed with a UTI at that time and given Augmentin.  Unfortunately because of her severe nausea she is unable to keep the Augmentin down.  She is also unable to keep an any of her regular medications including her Eliquis down because of the nausea.  Patient has tried by mouth Zofran without relief of her symptoms.  No true alleviating factors.  Aggravating factors include any oral intake including even cold water. Patient is also experiencing dysuria, urinary urgency and urinary frequency as is consistent with her UTI diagnosis.    Patient is a 62 y.o. female presenting with nausea and dysuria.   History provided by:  Patient  Nausea   The primary symptoms include fever, nausea, vomiting and dysuria. Primary symptoms do not include fatigue, abdominal pain or diarrhea. The illness began 3 to 5 days ago. The onset was sudden. The problem has been gradually worsening.   The dysuria is associated with frequency and urgency.   The illness is also significant for chills. The illness does not include constipation. Associated medical issues do not include inflammatory bowel disease, GERD, liver disease, irritable bowel syndrome or diverticulitis.   Female Dysuria   Pain quality:  Burning  Pain severity:  Mild  Onset quality:  Gradual  Duration:  3 days  Timing:  Intermittent  Progression:  Worsening  Chronicity:  New  Recent urinary tract infections: yes    Relieved by:  Nothing  Worsened by:  Nothing  Ineffective treatments:  Antibiotics  Associated symptoms: fever, nausea and vomiting     Associated symptoms: no abdominal pain and no flank pain    Risk factors: recurrent urinary tract infections        Review of Systems   Constitutional: Positive for chills and fever. Negative for fatigue.   HENT: Negative.  Negative for congestion and sore throat.    Respiratory: Negative.  Negative for cough and shortness of breath.    Cardiovascular: Negative.  Negative for chest pain, palpitations and leg swelling.   Gastrointestinal: Positive for nausea and vomiting. Negative for abdominal pain, constipation and diarrhea.   Endocrine: Negative.    Genitourinary: Positive for dysuria, frequency and urgency. Negative for flank pain.   Skin: Negative.    Neurological: Negative.    Psychiatric/Behavioral: Negative.    All other systems reviewed and are negative.      Past Medical History:   Diagnosis Date   • Bilateral Pulmonary embolism    • Bilateral pulmonary embolism    • Deep vein thrombosis    • Hypertension    • Osteoarthritis    • Osteoporosis    • SVT (supraventricular tachycardia) 7/25/2016   • Varicose veins        No Known Allergies    Past Surgical History:   Procedure Laterality Date   • DILATATION AND CURETTAGE      first trimester miscarriage       Family History   Problem Relation Age of Onset   • Cancer Father      lung   • Atrial fibrillation Mother    • Heart attack Sister    • Heart attack Brother        Social History     Social History   • Marital status:      Spouse name: N/A   • Number of children: N/A   • Years of education: N/A     Social History Main Topics   • Smoking status: Former Smoker     Packs/day: 1.00   • Smokeless tobacco: None      Comment: 8 months   • Alcohol use No   • Drug use: No   • Sexual activity: Not Asked     Other Topics Concern   • None     Social History Narrative           Objective   Physical Exam   Constitutional: She is oriented to person, place, and time. She appears well-developed and well-nourished. No distress.   HENT:   Head: Normocephalic and  atraumatic.   Eyes: Conjunctivae and EOM are normal. Pupils are equal, round, and reactive to light.   Neck: Normal range of motion. Neck supple. No JVD present. No tracheal deviation present.   Cardiovascular: Normal rate, regular rhythm and normal heart sounds.    No murmur heard.  Pulmonary/Chest: Effort normal and breath sounds normal. No respiratory distress. She has no wheezes.   Abdominal: Soft. Bowel sounds are normal. There is no tenderness.   Musculoskeletal: Normal range of motion. She exhibits no edema or deformity.   Neurological: She is alert and oriented to person, place, and time. No cranial nerve deficit.   Skin: Skin is warm and dry. No rash noted. She is not diaphoretic. No erythema. No pallor.   Psychiatric: She has a normal mood and affect. Her behavior is normal. Thought content normal.   Nursing note and vitals reviewed.      Procedures         ED Course  ED Course   Value Comment By Time    WBC decreased in comparison to previous labs. CAROL Gillette 06/06 0934    UA negative for UTI. Some hematuria noted. CAROL Gillette 06/06 0935    Amylase and Lipase normal CAROL Gillette 06/06 0935    AST and ALT slightly elevated. Acute hepatitis panel and ultrasound ordered. CAROL Gillette 06/06 0935   US Abdomen Limited No acute changes per radiology report. CAROL Gillette 06/06 1106    WBC mildly low, liver enzymes mildly elevated likely due to viral stomach issue with vomiting/dehydration. CAROL Gillette 06/06 1124    Patient is feeling much better after promethazine IV and fluid administration. Nausea nearly completely alleviated. If patient can tolerate some gatorade and fever resolves, we will d/c her home with promethazine rx and have her f/u with PCP for re-eval in 1 week as well as for eval of hematuria. CAROL Gillette 06/06 1130    Temp is 100. Patient is able to get 1/2 of a gatorade bottle down. Will d/c as previously noted. CAROL Gillette  06/06 1241                  [facility]  Number of Diagnoses or Management Options  Non-intractable vomiting with nausea, unspecified vomiting type:      Amount and/or Complexity of Data Reviewed  Clinical lab tests: ordered and reviewed  Tests in the radiology section of CPT®: ordered and reviewed  Decide to obtain previous medical records or to obtain history from someone other than the patient: yes  Discuss the patient with other providers: yes        Final diagnoses:   Non-intractable vomiting with nausea, unspecified vomiting type            CAROL Gillette  06/06/17 1246

## 2017-06-08 LAB — BACTERIA SPEC AEROBE CULT: NO GROWTH

## 2017-08-28 ENCOUNTER — OFFICE VISIT (OUTPATIENT)
Dept: ONCOLOGY | Facility: CLINIC | Age: 62
End: 2017-08-28

## 2017-08-28 VITALS
RESPIRATION RATE: 18 BRPM | OXYGEN SATURATION: 98 % | SYSTOLIC BLOOD PRESSURE: 139 MMHG | HEART RATE: 70 BPM | BODY MASS INDEX: 29.44 KG/M2 | DIASTOLIC BLOOD PRESSURE: 73 MMHG | TEMPERATURE: 97.5 F | WEIGHT: 182.4 LBS

## 2017-08-28 DIAGNOSIS — L65.9 HAIR LOSS: ICD-10-CM

## 2017-08-28 DIAGNOSIS — R91.8 PULMONARY NODULES: ICD-10-CM

## 2017-08-28 DIAGNOSIS — I26.99 PULMONARY EMBOLISM, OTHER: Primary | ICD-10-CM

## 2017-08-28 LAB
ALBUMIN SERPL-MCNC: 4 G/DL (ref 3.4–4.8)
ALBUMIN/GLOB SERPL: 1.5 G/DL (ref 1.5–2.5)
ALP SERPL-CCNC: 73 U/L (ref 35–104)
ALT SERPL W P-5'-P-CCNC: 16 U/L (ref 10–36)
ANION GAP SERPL CALCULATED.3IONS-SCNC: 5.6 MMOL/L (ref 3.6–11.2)
AST SERPL-CCNC: 20 U/L (ref 10–30)
BASOPHILS # BLD AUTO: 0.05 10*3/MM3 (ref 0–0.3)
BASOPHILS NFR BLD AUTO: 0.6 % (ref 0–2)
BILIRUB SERPL-MCNC: 0.3 MG/DL (ref 0.2–1.8)
BUN BLD-MCNC: 10 MG/DL (ref 7–21)
BUN/CREAT SERPL: 15.2 (ref 7–25)
CALCIUM SPEC-SCNC: 9.3 MG/DL (ref 7.7–10)
CHLORIDE SERPL-SCNC: 107 MMOL/L (ref 99–112)
CO2 SERPL-SCNC: 31.4 MMOL/L (ref 24.3–31.9)
CREAT BLD-MCNC: 0.66 MG/DL (ref 0.43–1.29)
DEPRECATED RDW RBC AUTO: 40.3 FL (ref 37–54)
EOSINOPHIL # BLD AUTO: 0.59 10*3/MM3 (ref 0–0.7)
EOSINOPHIL NFR BLD AUTO: 7.5 % (ref 0–5)
ERYTHROCYTE [DISTWIDTH] IN BLOOD BY AUTOMATED COUNT: 13.1 % (ref 11.5–14.5)
GFR SERPL CREATININE-BSD FRML MDRD: 91 ML/MIN/1.73
GLOBULIN UR ELPH-MCNC: 2.6 GM/DL
GLUCOSE BLD-MCNC: 102 MG/DL (ref 70–110)
HCT VFR BLD AUTO: 39.7 % (ref 37–47)
HGB BLD-MCNC: 13.2 G/DL (ref 12–16)
IMM GRANULOCYTES # BLD: 0.01 10*3/MM3 (ref 0–0.03)
IMM GRANULOCYTES NFR BLD: 0.1 % (ref 0–0.5)
LYMPHOCYTES # BLD AUTO: 2.94 10*3/MM3 (ref 1–3)
LYMPHOCYTES NFR BLD AUTO: 37.3 % (ref 21–51)
MCH RBC QN AUTO: 28.9 PG (ref 27–33)
MCHC RBC AUTO-ENTMCNC: 33.2 G/DL (ref 33–37)
MCV RBC AUTO: 87.1 FL (ref 80–94)
MONOCYTES # BLD AUTO: 0.57 10*3/MM3 (ref 0.1–0.9)
MONOCYTES NFR BLD AUTO: 7.2 % (ref 0–10)
NEUTROPHILS # BLD AUTO: 3.72 10*3/MM3 (ref 1.4–6.5)
NEUTROPHILS NFR BLD AUTO: 47.3 % (ref 30–70)
OSMOLALITY SERPL CALC.SUM OF ELEC: 286.1 MOSM/KG (ref 273–305)
PLATELET # BLD AUTO: 182 10*3/MM3 (ref 130–400)
PMV BLD AUTO: 10.5 FL (ref 6–10)
POTASSIUM BLD-SCNC: 3.7 MMOL/L (ref 3.5–5.3)
PROT SERPL-MCNC: 6.6 G/DL (ref 6–8)
RBC # BLD AUTO: 4.56 10*6/MM3 (ref 4.2–5.4)
SODIUM BLD-SCNC: 144 MMOL/L (ref 135–153)
TSH SERPL DL<=0.05 MIU/L-ACNC: 0.85 MIU/ML (ref 0.55–4.78)
WBC NRBC COR # BLD: 7.88 10*3/MM3 (ref 4.5–12.5)

## 2017-08-28 PROCEDURE — 85025 COMPLETE CBC W/AUTO DIFF WBC: CPT | Performed by: INTERNAL MEDICINE

## 2017-08-28 PROCEDURE — 84443 ASSAY THYROID STIM HORMONE: CPT | Performed by: INTERNAL MEDICINE

## 2017-08-28 PROCEDURE — 99214 OFFICE O/P EST MOD 30 MIN: CPT | Performed by: INTERNAL MEDICINE

## 2017-08-28 PROCEDURE — 80053 COMPREHEN METABOLIC PANEL: CPT | Performed by: INTERNAL MEDICINE

## 2017-08-28 NOTE — PROGRESS NOTES
Eva Kaba  [id number]  1955 8/28/2017      Reason for Follow up:   Bilateral pulmonary embolism   Pulmonary nodules    Primary Care Provider:   Dolores Troy PA-C    Chief Complaint:   Arthritic Pain      History of Present Illness   Ms. Kaba is a very pleasant 61 year old   female who presents in followup appointment for further management of bilateral pulmonary embolism and pulmonary nodules.      She originally presented to her primary care physicians office in 2/2016 with complaints of pressure like chest pain and palpitation. At her PCP's office she was found to have a HR of 190 and was subsequently transferred to the emergency room for further evaluation. She also received adenosine in the ambulance with improvement in her heart ra te. Prior to her visit she had been having increasing palpitations. She denied of any fevers, dyspnea, lower extremity pain, or pleuritic pain at the time. Upon further evaluation in the emergency department she underwent a CT PE protocol which was significant for bilateral pulmonary emboli and was subsequently started on anticoagulation and currently remain on Eliquis 5mg oral BID. She does not carry any previous history of thrombosis or abnormal bleeding and denied of any prolonged immobilization and reported that she remains quite active. She also denied of any trauma or falls. No history of tobacco or hormonal use. She has 7 children and has suffered from 1 miscarriage in her first trimester. She otherwise denied of any family history of miscarriages, bleeding or clotting disorders. She was hospitalized for recurrent SVT in which she had a CT scan performed showing pulmonary nodules that was concerning for possible metastases versus infectious process. She was to be scheduled for colonoscopy and mammogram and has been following with Dr. Almanzar and now with Dr. Guillen for her lung nodules and for possible bronchoscopy and with Dr. Hall for colonoscopy.      During her visit with me today she denies of any significant complaints with the exception of pain related to arthritis. She is tolerating anticoagulation well without any abnormal or spontaneous bleeding. She is to follow with Dr. Guillen in regards to follow up of her pulmonary nodules and repeat imaging.      No Known Allergies  No known drug allergies       Past Medical History:   Diagnosis Date   • Bilateral Pulmonary embolism    • Bilateral pulmonary embolism    • Deep vein thrombosis    • Hypertension    • Osteoarthritis    • Osteoporosis    • SVT (supraventricular tachycardia) 7/25/2016   • Varicose veins        Past Surgical History:   Procedure Laterality Date   • DILATATION AND CURETTAGE      first trimester miscarriage       History     Social History   • Marital status:      Spouse name: N/A   • Number of children: N/A   • Years of education: N/A     Occupational History   • Not on file.     Social History Main Topics   • Smoking status: Former Smoker     Packs/day: 1.00   • Smokeless tobacco: Not on file      Comment: 8 months   • Alcohol use: No   • Drug use: No   • Sexual activity: Not on file     Other Topics Concern   • Not on file     Social History Narrative   • She lives in [place], [place] with her . She was a previous smoker for 8 months <1ppd in the 1970s. She denies of any alcohol or illicit drug use. She currently works as a cook at [employer]. She denies of any hormone use.        Family History   Problem Relation Age of Onset   • Cancer Father      lung   • Atrial fibrillation Mother    • Heart attack Sister    • Heart attack Brother          Medications:  Currently on Eliquis      Review of Systems  Constitutional: No fever, chills, night sweats or weight loss. +hair loss  HEENT:  No headaches, vision changes or hearing changes, no sinus drainage.   Cardiovascular:  No palpitations, chest pain, syncopal episodes or edema.   Pulmonary:  No shortness of breath,  no hemoptysis, no cough.   Gastrointestinal:  No nausea or vomiting.  +constipation, no diarrhea, no melena or hematochezia   Genitourinary:  No hematuria, or changes in urination.   Musculoskeletal:  + arthritic pain  Skin: No rashes or pruritus.   Endocrine:  No hot flashes or chills   Hematologic:  No easy bleeding, +bruising since being on Eliquis  Immunologic:  No allergies or frequent infections.   Neurologic: No numbness, tingling, or weakness.   Psychiatric:  No anxiety or depression.         Physical Exam  Vital Signs: These were reviewed and listed as per patient’s electronic medical chart  Vitals:    08/28/17 1335   BP: 139/73   Pulse: 70   Resp: 18   Temp: 97.5 °F (36.4 °C)   SpO2: 98%     General: Awake, alert and oriented, in no distress   HEENT: Head is atraumatic, normocephalic, extraocular movements full, oropharynx clear, no scleral icterus, pink moist mucous membranes   Neck: supple, no jvd, lymphadenopathy or masses   Cardiovascular: regular rate and rhythm without murmurs, rubs or gallops   Pulmonary: clear to auscultation bilaterally, no wheezing  Abdomen: soft, non-tender, non-distended, normal active bowel sounds present, no organomegaly   Extremities: No clubbing, cyanosis or edema   Lymph: No cervical, supraclavicular adenopathy   Neurologic: Mental status as above, alert, awake and oriented, grossly non-focal exam         Labs / Studies:  Results for DENNIS MAZA (MRN [id number]) as of 8/28/2017 15:56   Ref. Range 8/28/2017 14:28   Glucose Latest Ref Range: 70 - 110 mg/dL 102   Sodium Latest Ref Range: 135 - 153 mmol/L 144   Potassium Latest Ref Range: 3.5 - 5.3 mmol/L 3.7   CO2 Latest Ref Range: 24.3 - 31.9 mmol/L 31.4   Chloride Latest Ref Range: 99 - 112 mmol/L 107   Anion Gap Latest Ref Range: 3.6 - 11.2 mmol/L 5.6   Creatinine Latest Ref Range: 0.43 - 1.29 mg/dL 0.66   BUN Latest Ref Range: 7 - 21 mg/dL 10   BUN/Creatinine Ratio Latest Ref Range: 7.0 - 25.0  15.2   Calcium Latest Ref  Range: 7.7 - 10.0 mg/dL 9.3   eGFR Non African Amer Latest Ref Range: >60 mL/min/1.73 91   Alkaline Phosphatase Latest Ref Range: 35 - 104 U/L 73   Total Protein Latest Ref Range: 6.0 - 8.0 g/dL 6.6   ALT (SGPT) Latest Ref Range: 10 - 36 U/L 16   AST (SGOT) Latest Ref Range: 10 - 30 U/L 20   Total Bilirubin Latest Ref Range: 0.2 - 1.8 mg/dL 0.3   Albumin Latest Ref Range: 3.40 - 4.80 g/dL 4.00   Globulin Latest Units: gm/dL 2.6   A/G Ratio Latest Ref Range: 1.5 - 2.5 g/dL 1.5   TSH Baseline Latest Ref Range: 0.550 - 4.780 mIU/mL 0.855   Osmolality Calc Latest Ref Range: 273.0 - 305.0 mOsm/kg 286.1   WBC Latest Ref Range: 4.50 - 12.50 10*3/mm3 7.88   RBC Latest Ref Range: 4.20 - 5.40 10*6/mm3 4.56   Hemoglobin Latest Ref Range: 12.0 - 16.0 g/dL 13.2   Hematocrit Latest Ref Range: 37.0 - 47.0 % 39.7   RDW Latest Ref Range: 11.5 - 14.5 % 13.1   MCV Latest Ref Range: 80.0 - 94.0 fL 87.1   MCH Latest Ref Range: 27.0 - 33.0 pg 28.9   MCHC Latest Ref Range: 33.0 - 37.0 g/dL 33.2   MPV Latest Ref Range: 6.0 - 10.0 fL 10.5 (H)   Platelets Latest Ref Range: 130 - 400 10*3/mm3 182   RDW-SD Latest Ref Range: 37.0 - 54.0 fl 40.3   Neutrophil % Latest Ref Range: 30.0 - 70.0 % 47.3   Lymphocyte % Latest Ref Range: 21.0 - 51.0 % 37.3   Monocyte % Latest Ref Range: 0.0 - 10.0 % 7.2   Eosinophil % Latest Ref Range: 0.0 - 5.0 % 7.5 (H)   Basophil % Latest Ref Range: 0.0 - 2.0 % 0.6   Immature Grans % Latest Ref Range: 0.0 - 0.5 % 0.1   Neutrophils, Absolute Latest Ref Range: 1.40 - 6.50 10*3/mm3 3.72   Lymphocytes, Absolute Latest Ref Range: 1.00 - 3.00 10*3/mm3 2.94   Monocytes, Absolute Latest Ref Range: 0.10 - 0.90 10*3/mm3 0.57   Eosinophils, Absolute Latest Ref Range: 0.00 - 0.70 10*3/mm3 0.59   Basophils, Absolute Latest Ref Range: 0.00 - 0.30 10*3/mm3 0.05   Immature Grans, Absolute Latest Ref Range: 0.00 - 0.03 10*3/mm3 0.01       IMAGIN16: CT PE Protocol:   The pulmonary arteries were densely opacified. The  distribution of the contrast in the pulmonary arteries was remarkable for a filling defect in the right lower lobe pulmonary arteries consistent with thrombus. It is fairly small in size. The left pulmonary arteries also show a small area of thrombus in the lingular segment of the upper lobe. The aorta is normal in caliber. IMPRESSION- Bilateral pulmonary emboli in the lungs.     02/19/16: LE Duplex bilateral: There is patent spontan eous flow from the common femoral vein through the posterior tibial veins. There was no internal clot or area of noncompressibility. Normal augmentation was elicited where applicable. IMPRESSION- No DVT in the lower extremities on today's exam.     02/19/16: Carotid duplex: IMPRESSION- No hemodynamically significant stenosis     02/27/16: CT Abdomen/Pelvis:  1. LARGE ANTERIOR ABDOMINAL WALL HERNIA. 2. ABUNDANT STOOL IN THE DISTAL COLON. 3. SCATTERED RIGHT LOWER LOBE PULMONARY NODULES OF UNCERTAIN ETIOLOGY. THIS COULD BE METASTATIC OR POTENTIALLY INFECTIOUS IN ETIOLOGY.      06/26/16: CT CHEST PULMONARY EMBOLISM WITH CONTRAST: On today's exam the pulmonary arteries enhance homogeneously. The clots seen on the previous CT and February have resolved. The aorta is normal in caliber. There continues to be multiple pulmonary parenchymal lung nodules in the lungs. These are predominantly in the 1cm size range. Radiographically they are stable in comparing with the earlier CT. IMPRESSION: No residual pulmonary emboli. There continue to be several noncalcified pulmonary parenchymal lung nodules in the right lung which are stable.      09/28/16: NM PET SKULL BASE TO MID THIGH: The CT portion of the exam shows some small pulmonary parenchymal lung nodules which have been relatively stable in comparing with earlier CT scans. These are most prominent in the right lung base. The largest nodule measures 11 mm. All the lung nodules are metabolically inactive. There was no glucose uptake in any of these  lungnodules. There were no enlarged lymph nodes or areas of hypermetabolic activity in the mediastinum or supraclavicular areas. Below the diaphragm, the distribution of the fluorodeoxyglucose is physiologic. IMPRESSION: The lung nodules in the lung bases are metabolically inactive with no activity above background.    02/06/17: CT CHEST WO CONTRAST: There is no mediastinal or hilar lymph node enlargement. Parenchymal nodules are again noted, primarily in the right lower lobe. These are stable from the previous study. IMPRESSION: Multiple parenchymal nodules, especially in the right lower lobe similar to the previous exam.           Assessment/Plan   Ms. Kaba is a very pleasant 61 year old   female who presents in followup appointment after hospital discharge at the request of Dr. Velasquez for further evaluation of bilateral pulmonary embolism and for hypercoagulable workup.      1. Bilateral Pulmonary Embolisms   She currently remains on Eliquis for anticoagulation and has been tolerating this well without any bleeding episodes. At this time patient' s pulmonary embolism appears to be unprovoked and therefore I did obtain a hypercoagulable workup to further evaluate which was negative. Patient should be on anticoagulation for at least a minimum of 6 months, however even though hypercoagulable workup was negative, her bilateral pulmonary emboli appear to have been unprovoked and therefore for this reason I would recommend life long anticoagulation as it is possible that she could have a life threatening recurrence. She understands the risks and side effects of anticoagulation, including that she could have a light threatening bleed while on anticoagulation however she is agreeable to the current plan.     2. Pulmonary Nodules   She is currently following with Dr. Guillen for possible bronchoscopy however has declined this and preferred to be followed with imaging. Repeat CT PE did not show change in  pulmonary nodules with resolution of pulmonary emboli and PET scan along with recent repeat CT Chest again showed stability in nodules. She reports that she did undergo a mammogram which was negative. She has been unable to undergo colonoscopy due to anticoagulation. I can transition the patient from Eliquis to SC Lovenox temporarily if patient's anticoagulation regimen hinders patient from receiving procedures or biopsies that are deemed necessary which I have discussed with her. I have offered to make her a re-referral to gastroenterology for a colonoscopy however she states that she will follow up on her own.              3.   Hair Loss  I did check TSH to further evaluate however this was within normal limits.      I will have  Ms. Kaba return in follow up appointment in six months. She understands that should  she have any questions or concerns prior to her  appointment she should give us a call at any time  and I would be happy to see her sooner. It was a pleasure to see Ms. Kaba in clinic today, thank you for allowing me to participate in the care of this patient.     I spent 26 minutes in regards to this patient's care today. More than 19 minutes of the time was spent in direct interaction with the patient discussing the above problems and her imaging results.         Eulalia Mims MD  08/28/17  2:08 PM

## 2017-09-11 ENCOUNTER — HOSPITAL ENCOUNTER (OUTPATIENT)
Dept: CT IMAGING | Facility: HOSPITAL | Age: 62
Discharge: HOME | End: 2017-09-11
Attending: INTERNAL MEDICINE | Admitting: INTERNAL MEDICINE

## 2017-09-11 DIAGNOSIS — R93.89 ABNORMAL CT SCAN, CHEST: ICD-10-CM

## 2017-09-11 PROCEDURE — 71250 CT THORAX DX C-: CPT | Performed by: RADIOLOGY

## 2017-09-11 PROCEDURE — 71250 CT THORAX DX C-: CPT

## 2017-09-20 ENCOUNTER — OFFICE VISIT (OUTPATIENT)
Dept: PULMONOLOGY | Facility: CLINIC | Age: 62
End: 2017-09-20

## 2017-09-20 VITALS
HEART RATE: 75 BPM | BODY MASS INDEX: 28.93 KG/M2 | SYSTOLIC BLOOD PRESSURE: 138 MMHG | OXYGEN SATURATION: 97 % | TEMPERATURE: 97.9 F | WEIGHT: 180 LBS | HEIGHT: 66 IN | DIASTOLIC BLOOD PRESSURE: 90 MMHG

## 2017-09-20 DIAGNOSIS — R91.8 PULMONARY NODULES: Primary | ICD-10-CM

## 2017-09-20 DIAGNOSIS — R93.89 ABNORMAL CT SCAN: ICD-10-CM

## 2017-09-20 DIAGNOSIS — I26.99 PULMONARY EMBOLISM, OTHER: ICD-10-CM

## 2017-09-20 DIAGNOSIS — J30.1 SEASONAL ALLERGIC RHINITIS DUE TO POLLEN: ICD-10-CM

## 2017-09-20 PROCEDURE — 94664 DEMO&/EVAL PT USE INHALER: CPT | Performed by: INTERNAL MEDICINE

## 2017-09-20 PROCEDURE — 99213 OFFICE O/P EST LOW 20 MIN: CPT | Performed by: INTERNAL MEDICINE

## 2017-09-20 RX ORDER — FLUTICASONE PROPIONATE 50 MCG
2 SPRAY, SUSPENSION (ML) NASAL DAILY
Qty: 1 BOTTLE | Refills: 2 | Status: SHIPPED | OUTPATIENT
Start: 2017-09-20 | End: 2018-02-01 | Stop reason: SDUPTHER

## 2017-09-20 NOTE — PROGRESS NOTES
Interval history since last visit: None    Recent hospitalizations: None    Investigations (imaging, PFT's, labs, sleep study, record requests, etc.)  CT    Have you had the Influenza Vaccine? no   Would you like to receive this Vaccine today? no    Have you had the Pneumonia Vaccine?  no  Would you like to receive this Vaccine today? no    Subjective    Eva Kaba presents for the following Abnormal CT      History of Present Illness     Ms. Kaba is here today to follow up on an abnormal CT scan. Patient states that since her last visit she has been feeling well and notes no complaints of shortness of breath.  She denies any illnesses or hospitalizations since her last visit.    Review of Systems   Constitutional: Negative for activity change, fatigue and unexpected weight change.   HENT: Positive for congestion. Negative for postnasal drip and rhinorrhea.    Respiratory: Positive for wheezing. Negative for apnea, cough, chest tightness and shortness of breath.    Cardiovascular: Negative for chest pain and palpitations.   Gastrointestinal: Negative for nausea.   Allergic/Immunologic: Negative for environmental allergies.   Psychiatric/Behavioral: Negative for agitation and confusion.       Active Problems:  Problem List Items Addressed This Visit     Pulmonary embolism    Relevant Medications    fluticasone (FLONASE) 50 MCG/ACT nasal spray    Pulmonary nodules - Primary    Seasonal allergies    Abnormal CT scan    Relevant Orders    US axilla right          Past Medical History:  Past Medical History:   Diagnosis Date   • Bilateral Pulmonary embolism    • Bilateral pulmonary embolism    • Deep vein thrombosis    • Hypertension    • Osteoarthritis    • Osteoporosis    • SVT (supraventricular tachycardia) 7/25/2016   • Varicose veins        Family History:  Family History   Problem Relation Age of Onset   • Cancer Father      lung   • Atrial fibrillation Mother    • Heart attack Sister    • Heart attack Brother   "      Social History:  Social History   Substance Use Topics   • Smoking status: Former Smoker     Packs/day: 1.00   • Smokeless tobacco: Not on file      Comment: 8 months   • Alcohol use No       Current Medications:  Current Outpatient Prescriptions   Medication Sig Dispense Refill   • albuterol (PROVENTIL HFA;VENTOLIN HFA) 108 (90 BASE) MCG/ACT inhaler Inhale 2 puffs every 4 (four) hours as needed for wheezing.     • apixaban (ELIQUIS) 5 MG tablet tablet Take 1 tablet by mouth 2 (two) times a day. 60 tablet 2   • DOCQLACE 100 MG capsule   6   • docusate calcium (SURFAK) 240 MG capsule Take 240 mg by mouth 2 (two) times a day.     • fluticasone (FLONASE) 50 MCG/ACT nasal spray 2 sprays into each nostril Daily. Administer 2 sprays in each nostril for each dose. 1 bottle 2   • lisinopril (PRINIVIL,ZESTRIL) 10 MG tablet Take 10 mg by mouth daily.     • metoprolol tartrate (LOPRESSOR) 25 MG tablet Take 1 tablet by mouth 2 (two) times a day. Takes 1&1/2 tabs twice daily (Patient taking differently: Take 25 mg by mouth 2 (Two) Times a Day.) 60 tablet 5     No current facility-administered medications for this visit.        Allergies:  No Known Allergies    Vitals:  /90  Pulse 75  Temp 97.9 °F (36.6 °C) (Oral)   Ht 66\" (167.6 cm)  Wt 180 lb (81.6 kg)  SpO2 97%  BMI 29.05 kg/m2    Imaging:    Imaging Results (most recent)     None          Pulmonary Functions Testing Results:    No results found for: FEV1, FVC, IPV7ZUN, TLC, DLCO    Results for orders placed or performed in visit on 08/28/17   TSH   Result Value Ref Range    TSH 0.855 0.550 - 4.780 mIU/mL   Comprehensive Metabolic Panel   Result Value Ref Range    Glucose 102 70 - 110 mg/dL    BUN 10 7 - 21 mg/dL    Creatinine 0.66 0.43 - 1.29 mg/dL    Sodium 144 135 - 153 mmol/L    Potassium 3.7 3.5 - 5.3 mmol/L    Chloride 107 99 - 112 mmol/L    CO2 31.4 24.3 - 31.9 mmol/L    Calcium 9.3 7.7 - 10.0 mg/dL    Total Protein 6.6 6.0 - 8.0 g/dL    Albumin 4.00 " 3.40 - 4.80 g/dL    ALT (SGPT) 16 10 - 36 U/L    AST (SGOT) 20 10 - 30 U/L    Alkaline Phosphatase 73 35 - 104 U/L    Total Bilirubin 0.3 0.2 - 1.8 mg/dL    eGFR Non African Amer 91 >60 mL/min/1.73    Globulin 2.6 gm/dL    A/G Ratio 1.5 1.5 - 2.5 g/dL    BUN/Creatinine Ratio 15.2 7.0 - 25.0    Anion Gap 5.6 3.6 - 11.2 mmol/L   CBC Auto Differential   Result Value Ref Range    WBC 7.88 4.50 - 12.50 10*3/mm3    RBC 4.56 4.20 - 5.40 10*6/mm3    Hemoglobin 13.2 12.0 - 16.0 g/dL    Hematocrit 39.7 37.0 - 47.0 %    MCV 87.1 80.0 - 94.0 fL    MCH 28.9 27.0 - 33.0 pg    MCHC 33.2 33.0 - 37.0 g/dL    RDW 13.1 11.5 - 14.5 %    RDW-SD 40.3 37.0 - 54.0 fl    MPV 10.5 (H) 6.0 - 10.0 fL    Platelets 182 130 - 400 10*3/mm3    Neutrophil % 47.3 30.0 - 70.0 %    Lymphocyte % 37.3 21.0 - 51.0 %    Monocyte % 7.2 0.0 - 10.0 %    Eosinophil % 7.5 (H) 0.0 - 5.0 %    Basophil % 0.6 0.0 - 2.0 %    Immature Grans % 0.1 0.0 - 0.5 %    Neutrophils, Absolute 3.72 1.40 - 6.50 10*3/mm3    Lymphocytes, Absolute 2.94 1.00 - 3.00 10*3/mm3    Monocytes, Absolute 0.57 0.10 - 0.90 10*3/mm3    Eosinophils, Absolute 0.59 0.00 - 0.70 10*3/mm3    Basophils, Absolute 0.05 0.00 - 0.30 10*3/mm3    Immature Grans, Absolute 0.01 0.00 - 0.03 10*3/mm3   Osmolality, Calculated   Result Value Ref Range    Osmolality Calc 286.1 273.0 - 305.0 mOsm/kg       Objective   Physical Exam     GENERAL APPEARANCE: Well developed, well nourished, alert and cooperative, and appears to be in no acute distress.    HEAD: normocephalic.    EYES: PERRL, EOMI. Fundi normal, vision is grossly intact.    THROAT: Oral cavity and pharynx normal. No inflammation, swelling, exudate, or lesions.     NECK: Neck supple.     CARDIAC: Normal S1 and S2. No S3, S4 or murmurs. Rhythm is regular. There is no peripheral edema, cyanosis or pallor. Extremities are warm and well perfused. Capillary refill is less than 2 seconds. No carotid bruits.    RESPIRATORY: Clear to auscultation without rales,  rhonchi, wheezing or diminished breath sounds.    GI: Positive bowel sounds. Soft, nondistended, nontender.     MUSCULOSKELETAL: No significant deformity or joint abnormality. No edema. Peripheral pulses intact. No varicosities.    NEUROLOGICAL: Strength and sensation symmetric and intact throughout.     PSYCHIATRIC: The mental examination revealed the patient was oriented to person, place, and time.       Assessment/Plan      1. Pulmonary nodules: Patient reports no problems or issues.  CT scan was done on 9/11.  Lung nodule remains stable. Will repeat CT scan of lungs in one year to reevaluate.    New finding of a 2.0 cm nodule in the right axilla was noted.  Ordered an ultrasound and will contact patient's PCP to let her know.      Pulmonary embolism:  Continue management per hematology.  Patient follows with Dr. Mims.    Shortness of breath-continue current inhalers.  Inhalational technique checked and is appropriate    Seasonal allergies:  sent refill for Flonase.    Vaccines:  Patient declined influenza vaccine.    Scribed for Dr. Guillen, by RADHA Laguna            ICD-10-CM ICD-9-CM   1. Pulmonary nodules R91.8 793.19   2. Pulmonary embolism, other I26.99    3. Seasonal allergic rhinitis due to pollen J30.1 477.0   4. Abnormal CT scan R93.8 793.99       Return in about 1 year (around 9/20/2018).          Addi MARTINEZ M.D. attest that the above note accurately reflects the work and decisions made  by me.  Patient was seen and evaluated by Dr. Guillen, including history of present illness, physical exam, assessment, and treatment plan.  The above note was reviewed and edited by Dr. Guillen.

## 2017-11-07 ENCOUNTER — HOSPITAL ENCOUNTER (OUTPATIENT)
Dept: MAMMOGRAPHY | Facility: HOSPITAL | Age: 62
Discharge: HOME | End: 2017-11-07

## 2017-11-07 ENCOUNTER — HOSPITAL ENCOUNTER (OUTPATIENT)
Dept: ULTRASOUND IMAGING | Facility: HOSPITAL | Age: 62
Discharge: HOME | End: 2017-11-07
Admitting: NURSE PRACTITIONER

## 2017-11-07 ENCOUNTER — DOCUMENTATION (OUTPATIENT)
Dept: PULMONOLOGY | Facility: CLINIC | Age: 62
End: 2017-11-07

## 2017-11-07 DIAGNOSIS — R93.89 ABNORMAL ULTRASOUND: Primary | ICD-10-CM

## 2017-11-07 DIAGNOSIS — R93.89 ABNORMAL CT OF THE CHEST: ICD-10-CM

## 2017-11-07 DIAGNOSIS — R93.89 ABNORMAL CT SCAN: ICD-10-CM

## 2017-11-07 PROCEDURE — G0279 TOMOSYNTHESIS, MAMMO: HCPCS

## 2017-11-07 PROCEDURE — 77062 BREAST TOMOSYNTHESIS BI: CPT | Performed by: RADIOLOGY

## 2017-11-07 PROCEDURE — 76882 US LMTD JT/FCL EVL NVASC XTR: CPT | Performed by: RADIOLOGY

## 2017-11-07 PROCEDURE — 76882 US LMTD JT/FCL EVL NVASC XTR: CPT

## 2017-11-07 PROCEDURE — 77066 DX MAMMO INCL CAD BI: CPT | Performed by: RADIOLOGY

## 2017-11-07 PROCEDURE — G0204 DX MAMMO INCL CAD BI: HCPCS

## 2017-11-07 NOTE — PROGRESS NOTES
Reviewed ultrasound of right axilla.  Will refer patient to general surgery for a fine needle biopsy of abnormal lymph node.  Called and discussed this with patient.

## 2017-11-13 ENCOUNTER — TELEPHONE (OUTPATIENT)
Dept: MAMMOGRAPHY | Facility: HOSPITAL | Age: 62
End: 2017-11-13

## 2017-11-13 ENCOUNTER — HOSPITAL ENCOUNTER (OUTPATIENT)
Dept: ULTRASOUND IMAGING | Facility: HOSPITAL | Age: 62
Discharge: HOME | End: 2017-11-13

## 2017-11-13 DIAGNOSIS — R59.9 ENLARGED LYMPH NODE: ICD-10-CM

## 2017-11-13 NOTE — TELEPHONE ENCOUNTER
Called ed to reschedule right axilla biopsy. Patient on eloquis. Have left a message with her prescribing MD for authorization to stop medication and to inform when its safe  to restart.

## 2017-11-14 ENCOUNTER — TELEPHONE (OUTPATIENT)
Dept: MAMMOGRAPHY | Facility: HOSPITAL | Age: 62
End: 2017-11-14

## 2017-11-14 NOTE — TELEPHONE ENCOUNTER
Dr. Guadarrama has approved of the patient stopping here Eloquis for 3 days for her to have her lymphnode biopsy. Will call the patient again when the appointment is set.

## 2017-11-15 ENCOUNTER — TELEPHONE (OUTPATIENT)
Dept: ONCOLOGY | Facility: CLINIC | Age: 62
End: 2017-11-15

## 2017-11-15 NOTE — TELEPHONE ENCOUNTER
Patient notified to stop eloquis 3 days prior to biopsy per Dr. Guadarrama. Patients appointment is November 20th at 10:30 am.

## 2017-11-20 ENCOUNTER — HOSPITAL ENCOUNTER (OUTPATIENT)
Dept: ULTRASOUND IMAGING | Facility: HOSPITAL | Age: 62
Discharge: HOME | End: 2017-11-20
Admitting: RADIOLOGY

## 2017-11-20 PROCEDURE — 76942 ECHO GUIDE FOR BIOPSY: CPT

## 2017-11-20 PROCEDURE — 10022: CPT | Performed by: RADIOLOGY

## 2017-11-21 LAB
LAB AP CASE REPORT: NORMAL
LAB AP CLINICAL INFORMATION: NORMAL
Lab: NORMAL

## 2017-11-27 ENCOUNTER — TELEPHONE (OUTPATIENT)
Dept: ONCOLOGY | Facility: CLINIC | Age: 62
End: 2017-11-27

## 2017-11-27 NOTE — TELEPHONE ENCOUNTER
----- Message from Dotty Watkins MD sent at 11/21/2017  3:54 PM EST -----  CYTOLOGY:    Negative for malignant cells.    Microscopic description: The specimen shows a polymorphic lymphoid population.     As the lymph node has enlarged considerably since February 2017, surgical excision to exclude a low-grade lymphoma or Hodgkin's lymphoma is recommended.  Unfixed tissue should be submitted in transport media for immunophenotyping by flow cytometry. The patient will be notified of the results and recommendations by a nurse in the breast center.

## 2017-11-27 NOTE — TELEPHONE ENCOUNTER
Made patient aware of biopsy results and the need for a surgical excision. Patient will call back wwith her decision of a surgeon.

## 2017-11-28 ENCOUNTER — TELEPHONE (OUTPATIENT)
Dept: MAMMOGRAPHY | Facility: HOSPITAL | Age: 62
End: 2017-11-28

## 2017-12-13 ENCOUNTER — OFFICE VISIT (OUTPATIENT)
Dept: PULMONOLOGY | Facility: CLINIC | Age: 62
End: 2017-12-13

## 2017-12-13 VITALS
OXYGEN SATURATION: 96 % | WEIGHT: 181 LBS | DIASTOLIC BLOOD PRESSURE: 72 MMHG | HEIGHT: 66 IN | HEART RATE: 63 BPM | TEMPERATURE: 97.9 F | BODY MASS INDEX: 29.09 KG/M2 | SYSTOLIC BLOOD PRESSURE: 142 MMHG

## 2017-12-13 DIAGNOSIS — L02.419 ABSCESS OF AXILLA: ICD-10-CM

## 2017-12-13 DIAGNOSIS — R91.8 PULMONARY NODULES: Primary | ICD-10-CM

## 2017-12-13 PROCEDURE — 99213 OFFICE O/P EST LOW 20 MIN: CPT | Performed by: NURSE PRACTITIONER

## 2017-12-13 RX ORDER — AMOXICILLIN AND CLAVULANATE POTASSIUM 875; 125 MG/1; MG/1
1 TABLET, FILM COATED ORAL 2 TIMES DAILY
Qty: 20 TABLET | Refills: 0 | Status: SHIPPED | OUTPATIENT
Start: 2017-12-13 | End: 2020-01-17

## 2017-12-13 NOTE — PROGRESS NOTES
Interval history since last visit: None    Recent hospitalizations: None    Investigations (imaging, PFT's, labs, sleep study, record requests, etc.) None    Have you had the Influenza Vaccine? no   Would you like to receive this Vaccine today? no    Have you had the Pneumonia Vaccine?  no  Would you like to receive this Vaccine today? no    Subjective    Eva Kaba presents for the following Lung Nodule      History of Present Illness     Ms. Kaba is here today to follow up on a nodule on her right axilla.  Fine needle biopsy was done and it was recommended that the patient have the nodule removed if it had not responded to antibiotic.  She states that she has not taken any antibiotics and would liek to try this before having it removed.    Review of Systems   Constitutional: Negative for activity change, fatigue and unexpected weight change.   HENT: Negative for congestion, postnasal drip and rhinorrhea.    Respiratory: Positive for wheezing. Negative for apnea, cough, chest tightness and shortness of breath.    Cardiovascular: Negative for chest pain and palpitations.   Gastrointestinal: Negative for nausea.   Allergic/Immunologic: Negative for environmental allergies.   Psychiatric/Behavioral: Negative for agitation and confusion.       Active Problems:  Problem List Items Addressed This Visit     Pulmonary nodules - Primary    Abscess of axilla          Past Medical History:  Past Medical History:   Diagnosis Date   • Bilateral Pulmonary embolism    • Bilateral pulmonary embolism    • Deep vein thrombosis    • Hypertension    • Osteoarthritis    • Osteoporosis    • SVT (supraventricular tachycardia) 7/25/2016   • Varicose veins        Family History:  Family History   Problem Relation Age of Onset   • Cancer Father      lung   • Atrial fibrillation Mother    • Heart attack Sister    • Heart attack Brother    • Breast cancer Neg Hx        Social History:  Social History   Substance Use Topics   • Smoking  "status: Former Smoker     Packs/day: 1.00   • Smokeless tobacco: Not on file      Comment: 8 months   • Alcohol use No       Current Medications:  Current Outpatient Prescriptions   Medication Sig Dispense Refill   • albuterol (PROVENTIL HFA;VENTOLIN HFA) 108 (90 BASE) MCG/ACT inhaler Inhale 2 puffs every 4 (four) hours as needed for wheezing.     • apixaban (ELIQUIS) 5 MG tablet tablet Take 1 tablet by mouth 2 (two) times a day. 60 tablet 2   • DOCQLACE 100 MG capsule   6   • docusate calcium (SURFAK) 240 MG capsule Take 240 mg by mouth 2 (two) times a day.     • fluticasone (FLONASE) 50 MCG/ACT nasal spray 2 sprays into each nostril Daily. Administer 2 sprays in each nostril for each dose. 1 bottle 2   • lisinopril (PRINIVIL,ZESTRIL) 10 MG tablet Take 10 mg by mouth daily.     • metoprolol tartrate (LOPRESSOR) 25 MG tablet Take 1 tablet by mouth 2 (two) times a day. Takes 1&1/2 tabs twice daily (Patient taking differently: Take 25 mg by mouth 2 (Two) Times a Day.) 60 tablet 5   • amoxicillin-clavulanate (AUGMENTIN) 875-125 MG per tablet Take 1 tablet by mouth 2 (Two) Times a Day. 20 tablet 0     No current facility-administered medications for this visit.        Allergies:  No Known Allergies    Vitals:  /72  Pulse 63  Temp 97.9 °F (36.6 °C) (Oral)   Ht 167.6 cm (66\")  Wt 82.1 kg (181 lb)  SpO2 96%  BMI 29.21 kg/m2    Imaging:    Imaging Results (most recent)     None          Pulmonary Functions Testing Results:    No results found for: FEV1, FVC, ZVK8CEH, TLC, DLCO    Results for orders placed or performed during the hospital encounter of 11/20/17   Non-gynecologic Cytology - Tissue, Lymph Node   Result Value Ref Range    Case Report       [facility]                           Case: [id number]                                  Authorizing Provider:  Dotty Watkins MD      Collected:           11/20/2017 10:53 AM          Ordering Location:     [facility]      Received:       "      11/20/2017 03:09 PM                                 [facility]                                                        Pathologist:           Kendall Campuzano MD                                                           Specimen:    Lymph Node                                                                                 Clinical Information       Formalin 11:11      Embedded Images         Objective   Physical Exam     GENERAL APPEARANCE: Well developed, well nourished, alert and cooperative, and appears to be in no acute distress.    HEAD: normocephalic.    EYES: PERRL, EOMI. Fundi normal, vision is grossly intact.    THROAT: Oral cavity and pharynx normal. No inflammation, swelling, exudate, or lesions.     NECK: Neck supple.     CARDIAC: Normal S1 and S2. No S3, S4 or murmurs. Rhythm is regular. There is no peripheral edema, cyanosis or pallor. Extremities are warm and well perfused. Capillary refill is less than 2 seconds. No carotid bruits.    RESPIRATORY: Clear to auscultation without rales, rhonchi, wheezing or diminished breath sounds.    GI: Positive bowel sounds. Soft, nondistended, nontender.     MUSCULOSKELETAL: No significant deformity or joint abnormality. No edema. Peripheral pulses intact. No varicosities.    NEUROLOGICAL: Strength and sensation symmetric and intact throughout.     PSYCHIATRIC: The mental examination revealed the patient was oriented to person, place, and time.       Assessment/Plan      1. Pulmonary nodules:no current issues.  Will repeat CT scan in one year to reevaluate.    Fine needle biopsy was done of right axilla.  Was told that she needed to have the lymph node removed for further studies.  Patient wants to try antibiotics first.        Jamshid give her Augmentin for 10 days. Advised patient to see her PCP for follow up ultrasound.      Pulmonary embolism:  Continue management per hematology.  Patient follows with Dr. Mims.    Shortness of breath:  Baseline.  No current problems.  Continue current inhalers.    Seasonal allergies:  Continue Flonase.    Vaccines:  Does not want flu vaccine            ICD-10-CM ICD-9-CM   1. Pulmonary nodules R91.8 793.19   2. Abscess of axilla L02.419 682.3       Return if symptoms worsen or fail to improve.

## 2017-12-29 DIAGNOSIS — R93.89 ABNORMAL CT SCAN, CHEST: Primary | ICD-10-CM

## 2018-01-18 ENCOUNTER — APPOINTMENT (OUTPATIENT)
Dept: CT IMAGING | Facility: HOSPITAL | Age: 63
End: 2018-01-18
Attending: INTERNAL MEDICINE

## 2018-01-25 ENCOUNTER — HOSPITAL ENCOUNTER (OUTPATIENT)
Dept: CT IMAGING | Facility: HOSPITAL | Age: 63
Discharge: HOME | End: 2018-01-25
Attending: INTERNAL MEDICINE | Admitting: INTERNAL MEDICINE

## 2018-01-25 DIAGNOSIS — R93.89 ABNORMAL CT SCAN, CHEST: ICD-10-CM

## 2018-01-25 PROCEDURE — 71250 CT THORAX DX C-: CPT

## 2018-01-25 PROCEDURE — 71250 CT THORAX DX C-: CPT | Performed by: RADIOLOGY

## 2018-02-01 ENCOUNTER — OFFICE VISIT (OUTPATIENT)
Dept: CARDIOLOGY | Facility: CLINIC | Age: 63
End: 2018-02-01

## 2018-02-01 ENCOUNTER — TELEPHONE (OUTPATIENT)
Dept: MAMMOGRAPHY | Facility: HOSPITAL | Age: 63
End: 2018-02-01

## 2018-02-01 VITALS
WEIGHT: 179 LBS | BODY MASS INDEX: 28.77 KG/M2 | DIASTOLIC BLOOD PRESSURE: 78 MMHG | SYSTOLIC BLOOD PRESSURE: 129 MMHG | HEART RATE: 73 BPM | HEIGHT: 66 IN

## 2018-02-01 DIAGNOSIS — Z79.01 LONG TERM CURRENT USE OF ANTICOAGULANT: ICD-10-CM

## 2018-02-01 DIAGNOSIS — I27.82 OTHER CHRONIC PULMONARY EMBOLISM WITHOUT ACUTE COR PULMONALE (HCC): Primary | ICD-10-CM

## 2018-02-01 DIAGNOSIS — I47.1 SVT (SUPRAVENTRICULAR TACHYCARDIA) (HCC): ICD-10-CM

## 2018-02-01 PROCEDURE — 99213 OFFICE O/P EST LOW 20 MIN: CPT | Performed by: PHYSICIAN ASSISTANT

## 2018-02-01 PROCEDURE — 93000 ELECTROCARDIOGRAM COMPLETE: CPT | Performed by: PHYSICIAN ASSISTANT

## 2018-02-01 RX ORDER — FLUTICASONE PROPIONATE 50 MCG
2 SPRAY, SUSPENSION (ML) NASAL DAILY
Qty: 1 BOTTLE | Refills: 2 | Status: SHIPPED | OUTPATIENT
Start: 2018-02-01

## 2018-02-01 RX ORDER — HYDROXYZINE PAMOATE 25 MG/1
25 CAPSULE ORAL 3 TIMES DAILY PRN
COMMUNITY
End: 2020-01-17 | Stop reason: ALTCHOICE

## 2018-02-01 NOTE — PATIENT INSTRUCTIONS
BMI for Adults  Body mass index (BMI) is a number that is calculated from a person's weight and height. In most adults, the number is used to find how much of an adult's weight is made up of fat. BMI is not as accurate as a direct measure of body fat.  HOW IS BMI CALCULATED?  BMI is calculated by dividing weight in kilograms by height in meters squared. It can also be calculated by dividing weight in pounds by height in inches squared, then multiplying the resulting number by 703. Charts are available to help you find your BMI quickly and easily without doing this calculation.   HOW IS BMI INTERPRETED?  Health care professionals use BMI charts to identify whether an adult is underweight, at a normal weight, or overweight based on the following guidelines:  · Underweight: BMI less than 18.5.  · Normal weight: BMI between 18.5 and 24.9.  · Overweight: BMI between 25 and 29.9.  · Obese: BMI of 30 and above.  BMI is usually interpreted the same for males and females.  Weight includes both fat and muscle, so someone with a muscular build, such as an athlete, may have a BMI that is higher than 24.9. In cases like these, BMI may not accurately depict body fat. To determine if excess body fat is the cause of a BMI of 25 or higher, further assessments may need to be done by a health care provider.  WHY IS BMI A USEFUL TOOL?  BMI is used to identify a possible weight problem that may be related to a medical problem or may increase the risk for medical problems. BMI can also be used to promote changes to reach a healthy weight.     This information is not intended to replace advice given to you by your health care provider. Make sure you discuss any questions you have with your health care provider.     Document Released: 08/29/2005 Document Revised: 01/08/2016 Document Reviewed: 05/15/2015  [employer]® Interactive Patient Education ©2017 [employer]       Calorie Counting for Weight Loss  Calories are energy you get from the  things you eat and drink. Your body uses this energy to keep you going throughout the day. The number of calories you eat affects your weight. When you eat more calories than your body needs, your body stores the extra calories as fat. When you eat fewer calories than your body needs, your body burns fat to get the energy it needs.  Calorie counting means keeping track of how many calories you eat and drink each day. If you make sure to eat fewer calories than your body needs, you should lose weight. In order for calorie counting to work, you will need to eat the number of calories that are right for you in a day to lose a healthy amount of weight per week. A healthy amount of weight to lose per week is usually 1-2 lb (0.5-0.9 kg). A dietitian can determine how many calories you need in a day and give you suggestions on how to reach your calorie goal.   WHAT IS MY MY PLAN?  My goal is to have __________ calories per day.   If I have this many calories per day, I should lose around __________ pounds per week.  WHAT DO I NEED TO KNOW ABOUT CALORIE COUNTING?  In order to meet your daily calorie goal, you will need to:  · Find out how many calories are in each food you would like to eat. Try to do this before you eat.  · Decide how much of the food you can eat.  · Write down what you ate and how many calories it had. Doing this is called keeping a food log.  WHERE DO I FIND CALORIE INFORMATION?  The number of calories in a food can be found on a Nutrition Facts label. Note that all the information on a label is based on a specific serving of the food. If a food does not have a Nutrition Facts label, try to look up the calories online or ask your dietitian for help.  HOW DO I DECIDE HOW MUCH TO EAT?  To decide how much of the food you can eat, you will need to consider both the number of calories in one serving and the size of one serving. This information can be found on the Nutrition Facts label. If a food does not  have a Nutrition Facts label, look up the information online or ask your dietitian for help.  Remember that calories are listed per serving. If you choose to have more than one serving of a food, you will have to multiply the calories per serving by the amount of servings you plan to eat. For example, the label on a package of bread might say that a serving size is 1 slice and that there are 90 calories in a serving. If you eat 1 slice, you will have eaten 90 calories. If you eat 2 slices, you will have eaten 180 calories.  HOW DO I KEEP A FOOD LOG?  After each meal, record the following information in your food log:  · What you ate.  · How much of it you ate.  · How many calories it had.  · Then, add up your calories.  Keep your food log near you, such as in a small notebook in your pocket. Another option is to use a mobile casey or website. Some programs will calculate calories for you and show you how many calories you have left each time you add an item to the log.  WHAT ARE SOME CALORIE COUNTING TIPS?  · Use your calories on foods and drinks that will fill you up and not leave you hungry. Some examples of this include foods like nuts and nut butters, vegetables, lean proteins, and high-fiber foods (more than 5 g fiber per serving).  · Eat nutritious foods and avoid empty calories. Empty calories are calories you get from foods or beverages that do not have many nutrients, such as candy and soda. It is better to have a nutritious high-calorie food (such as an avocado) than a food with few nutrients (such as a bag of chips).  · Know how many calories are in the foods you eat most often. This way, you do not have to look up how many calories they have each time you eat them.  · Look out for foods that may seem like low-calorie foods but are really high-calorie foods, such as baked goods, soda, and fat-free candy.  · Pay attention to calories in drinks. Drinks such as sodas, specialty coffee drinks, alcohol, and  juices have a lot of calories yet do not fill you up. Choose low-calorie drinks like water and diet drinks.  · Focus your calorie counting efforts on higher calorie items. Logging the calories in a garden salad that contains only vegetables is less important than calculating the calories in a milk shake.  · Find a way of tracking calories that works for you. Get creative. Most people who are successful find ways to keep track of how much they eat in a day, even if they do not count every calorie.  WHAT ARE SOME PORTION CONTROL TIPS?  · Know how many calories are in a serving. This will help you know how many servings of a certain food you can have.  · Use a measuring cup to measure serving sizes. This is helpful when you start out. With time, you will be able to estimate serving sizes for some foods.  · Take some time to put servings of different foods on your favorite plates, bowls, and cups so you know what a serving looks like.  · Try not to eat straight from a bag or box. Doing this can lead to overeating. Put the amount you would like to eat in a cup or on a plate to make sure you are eating the right portion.  · Use smaller plates, glasses, and bowls to prevent overeating. This is a quick and easy way to practice portion control. If your plate is smaller, less food can fit on it.  · Try not to multitask while eating, such as watching TV or using your computer. If it is time to eat, sit down at a table and enjoy your food. Doing this will help you to start recognizing when you are full. It will also make you more aware of what and how much you are eating.  HOW CAN I CALORIE COUNT WHEN EATING OUT?  · Ask for smaller portion sizes or child-sized portions.  · Consider sharing an entree and sides instead of getting your own entree.  · If you get your own entree, eat only half. Ask for a box at the beginning of your meal and put the rest of your entree in it so you are not tempted to eat it.  · Look for the calories  "on the menu. If calories are listed, choose the lower calorie options.  · Choose dishes that include vegetables, fruits, whole grains, low-fat dairy products, and lean protein. Focusing on smart food choices from each of the 5 food groups can help you stay on track at restaurants.  · Choose items that are boiled, broiled, grilled, or steamed.  · Choose water, milk, unsweetened iced tea, or other drinks without added sugars. If you want an alcoholic beverage, choose a lower calorie option. For example, a regular rose marie can have up to 700 calories and a glass of wine has around 150.  · Stay away from items that are buttered, battered, fried, or served with cream sauce. Items labeled \"crispy\" are usually fried, unless stated otherwise.  · Ask for dressings, sauces, and syrups on the side. These are usually very high in calories, so do not eat much of them.  · Watch out for salads. Many people think salads are a healthy option, but this is often not the case. Many salads come with dumont, fried chicken, lots of cheese, fried chips, and dressing. All of these items have a lot of calories. If you want a salad, choose a garden salad and ask for grilled meats or steak. Ask for the dressing on the side, or ask for olive oil and vinegar or lemon to use as dressing.  · Estimate how many servings of a food you are given. For example, a serving of cooked rice is ½ cup or about the size of half a tennis ball or one cupcake wrapper. Knowing serving sizes will help you be aware of how much food you are eating at restaurants. The list below tells you how big or small some common portion sizes are based on everyday objects.  ¨ 1 oz--4 stacked dice.  ¨ 3 oz--1 deck of cards.  ¨ 1 tsp--1 dice.  ¨ 1 Tbsp--½ a Ping-Pong ball.  ¨ 2 Tbsp--1 Ping-Pong ball.  ¨ ½ cup--1 tennis ball or 1 cupcake wrapper.  ¨ 1 cup--1 baseball.     This information is not intended to replace advice given to you by your health care provider. Make sure you " discuss any questions you have with your health care provider.     Document Released: 12/18/2006 Document Revised: 01/08/2016 Document Reviewed: 10/23/2014  Elsevier Interactive Patient Education ©2017 [employer]

## 2018-02-01 NOTE — PROGRESS NOTES
Dolores Troy PA-C  Eva Kaba  1955 02/01/2018    Patient Active Problem List   Diagnosis   • Pulmonary embolism   • Pulmonary nodules   • Seasonal allergies   • SVT (supraventricular tachycardia)   • Abnormal CT scan   • Abscess of axilla     Dear Dolores Troy PA-C:    Chief Complaint   Patient presents with   • Follow-up   • meds     verbal   • Rapid Heart Rate     Subjective     Eva Kaba is a 62 y.o. 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  Patient was sent for EP evaluation and was actually scheduled to have ablation however it was decided that the pulmonary emboli could have caused the tachycardia, hence it was decided to wait on ablation to see if it recurred once the pulmonary emboli or treated.  Patient has been on Eliquis since diagnosis of her pulmonary emboli in March 2016.       Current Outpatient Prescriptions:   •  albuterol (PROVENTIL HFA;VENTOLIN HFA) 108 (90 BASE) MCG/ACT inhaler, Inhale 2 puffs every 4 (four) hours as needed for wheezing., Disp: , Rfl:   •  apixaban (ELIQUIS) 5 MG tablet tablet, Take 1 tablet by mouth 2 (Two) Times a Day., Disp: 180 tablet, Rfl: 1  •  DOCQLACE 100 MG capsule, , Disp: , Rfl: 6  •  fluticasone (FLONASE) 50 MCG/ACT nasal spray, 2 sprays into each nostril Daily. Administer 2 sprays in each nostril for each dose., Disp: 1 bottle, Rfl: 2  •  hydrOXYzine (VISTARIL) 25 MG capsule, Take 25 mg by mouth 3 (Three) Times a Day As Needed for Itching., Disp: , Rfl:   •  lisinopril (PRINIVIL,ZESTRIL) 10 MG tablet, Take 10 mg by mouth daily., Disp: , Rfl:   •  metoprolol tartrate (LOPRESSOR) 25 MG tablet, Take 1 tablet by mouth 2 (Two) Times a Day., Disp: 180 tablet, Rfl: 1  •  Multiple Vitamin (MULTI VITAMIN DAILY PO), Take  by mouth., Disp: , Rfl:   •  amoxicillin-clavulanate (AUGMENTIN) 875-125 MG per tablet, Take 1 tablet by mouth 2 (Two) Times a Day., Disp: 20 tablet,  "Rfl: 0  •  docusate calcium (SURFAK) 240 MG capsule, Take 240 mg by mouth 2 (two) times a day., Disp: , Rfl:     The following portions of the patient's history were reviewed and updated as appropriate: allergies, current medications, past family history, past medical history, past social history, past surgical history and problem list.    Social History     Social History   • Marital status:      Spouse name: N/A   • Number of children: N/A   • Years of education: N/A     Occupational History   • Not on file.     Social History Main Topics   • Smoking status: Former Smoker     Packs/day: 1.00   • Smokeless tobacco: Never Used      Comment: 8 months   • Alcohol use No   • Drug use: No   • Sexual activity: Not on file     Other Topics Concern   • Not on file     Social History Narrative     Review of Systems   Constitution: Negative for weakness, malaise/fatigue and night sweats.   Cardiovascular: Negative for chest pain, dyspnea on exertion, leg swelling, near-syncope and palpitations.   Respiratory: Negative for shortness of breath.    Hematologic/Lymphatic: Negative for bleeding problem. Does not bruise/bleed easily.   Neurological: Negative for dizziness.     Objective   Blood pressure 129/78, pulse 73, height 167.6 cm (66\"), weight 81.2 kg (179 lb).  Body mass index is 28.89 kg/(m^2).    Physical Exam   Constitutional: She is oriented to person, place, and time. She appears well-developed and well-nourished. No distress.   HENT:   Head: Normocephalic and atraumatic.   Eyes: Conjunctivae are normal. Right eye exhibits no discharge. Left eye exhibits no discharge.   Neck: Normal range of motion. Neck supple. Carotid bruit is not present.   Cardiovascular: Normal rate, regular rhythm and normal heart sounds.  Exam reveals no gallop and no friction rub.    No murmur heard.  Pulmonary/Chest: Effort normal and breath sounds normal. No respiratory distress. She has no wheezes. She has no rales. She exhibits no " tenderness.   Musculoskeletal: Normal range of motion. She exhibits no edema.   Neurological: She is alert and oriented to person, place, and time.   Skin: Skin is warm and dry. No rash noted. She is not diaphoretic. No erythema. No pallor.   Psychiatric: She has a normal mood and affect. Her behavior is normal.   Nursing note and vitals reviewed.      ECG 12 Lead  Date/Time: 2/1/2018 1:37 PM  Performed by: SIERRA ECHAVARRIA  Authorized by: SIERRA ECHAVARRIA   Comparison: compared with previous ECG   Similar to previous ECG  Rhythm: sinus rhythm  Rate: normal  BPM: 74  Conduction: LAFB  QRS axis: left  Other findings: PRWP  Clinical impression: non-specific ECG  Comments: Sinus rhythm with chronic left anterior fascicular block and poor progression.  Chronic subtle T-wave inversion in aVL. No significant change compared to previous EKG          Assessment:          Diagnosis Plan   1. Other chronic pulmonary embolism without acute cor pulmonale  ECG 12 Lead   2. SVT (supraventricular tachycardia)  ECG 12 Lead   3. Long term current use of anticoagulant          Plan:       1. Continue current dose of metoprolol and Eliquis.  2. I have asked her to monitor for bleeding issues.  3. She was provided with educational materials on diet and calorie counting for weight loss.  4. She continues to follow with hematology regarding her previously unprovoked bilateral pulmonary emboli.  We will let them decide on any discontinuation of her chronic anticoagulant.  At this time, she is tolerating it very well and we will continue.  5. Follow-up one year or sooner if needed.    No Follow-up on file.    I appreciate the opportunity to participate in this patient's cardiovascular care.    Best Regards,    Sierra Echavarria PA-C

## 2018-02-01 NOTE — TELEPHONE ENCOUNTER
Patient had ct of chest in January of 2018 and there was no axilla adenopathy. Patient has been on antibiotic therapy.

## 2018-03-26 ENCOUNTER — OFFICE VISIT (OUTPATIENT)
Dept: ONCOLOGY | Facility: CLINIC | Age: 63
End: 2018-03-26

## 2018-03-26 VITALS
BODY MASS INDEX: 30.02 KG/M2 | WEIGHT: 186 LBS | OXYGEN SATURATION: 100 % | SYSTOLIC BLOOD PRESSURE: 164 MMHG | HEART RATE: 71 BPM | DIASTOLIC BLOOD PRESSURE: 78 MMHG | TEMPERATURE: 98.1 F | RESPIRATION RATE: 18 BRPM

## 2018-03-26 DIAGNOSIS — I26.99 OTHER PULMONARY EMBOLISM WITHOUT ACUTE COR PULMONALE, UNSPECIFIED CHRONICITY (HCC): ICD-10-CM

## 2018-03-26 DIAGNOSIS — R91.8 PULMONARY NODULES: Primary | ICD-10-CM

## 2018-03-26 PROCEDURE — 99214 OFFICE O/P EST MOD 30 MIN: CPT | Performed by: INTERNAL MEDICINE

## 2018-03-26 NOTE — PROGRESS NOTES
Eva Kaba  [id number]  1955  3/26/2018      Reason for Follow up:   Bilateral pulmonary embolism   Pulmonary nodules    Primary Care Provider:   Dolores Troy PA-C    Chief Complaint:   Arthritic Pain      History of Present Illness   Ms. Kaba is a very pleasant 62 year old   female who presents in followup appointment for further management of bilateral pulmonary embolism and pulmonary nodules.      She originally presented to her primary care physicians office in 2/2016 with complaints of pressure like chest pain and palpitation. At her PCP's office she was found to have a HR of 190 and was subsequently transferred to the emergency room for further evaluation. She also received adenosine in the ambulance with improvement in her heart ra te. Prior to her visit she had been having increasing palpitations. She denied of any fevers, dyspnea, lower extremity pain, or pleuritic pain at the time. Upon further evaluation in the emergency department she underwent a CT PE protocol which was significant for bilateral pulmonary emboli and was subsequently started on anticoagulation and currently remain on Eliquis 5mg oral BID. She does not carry any previous history of thrombosis or abnormal bleeding and denied of any prolonged immobilization and reported that she remains quite active. She also denied of any trauma or falls. No history of tobacco or hormonal use. She has 7 children and has suffered from 1 miscarriage in her first trimester. She otherwise denied of any family history of miscarriages, bleeding or clotting disorders. She was hospitalized for recurrent SVT in which she had a CT scan performed showing pulmonary nodules that was concerning for possible metastases versus infectious process. She has been following with Dr. Almanzar and now with Dr. Guillen for her lung nodules and for possible bronchoscopy.    During her visit with me she denies of any significant complaints. Today she is questioning  whether she could come off of Eliquis and consider aspirin use instead for anticoagulation. Currently she is on Eliquis and is tolerating this without any abnormal or spontaneous bleeding. Since her last visit she was found to have axillary lymphadenopathy on CT scan with Dr. Guillen. She underwent a biopsy that did not reveal malignant cells and improved with antibiotics and is to follow with her PCP for follow up ultrasound of her axilla.      No Known Allergies      Past Medical History:   Diagnosis Date   • Bilateral Pulmonary embolism    • Bilateral pulmonary embolism    • Deep vein thrombosis    • Hypertension    • Osteoarthritis    • Osteoporosis    • SVT (supraventricular tachycardia) 7/25/2016   • Varicose veins        Past Surgical History:   Procedure Laterality Date   • DILATATION AND CURETTAGE      first trimester miscarriage       History     Social History   • Marital status:      Spouse name: N/A   • Number of children: N/A   • Years of education: N/A     Occupational History   • Not on file.     Social History Main Topics   • Smoking status: Former Smoker     Packs/day: 1.00   • Smokeless tobacco: Not on file      Comment: 8 months   • Alcohol use: No   • Drug use: No   • Sexual activity: Not on file     Other Topics Concern   • Not on file     Social History Narrative   • She lives in [place], [place] with her . She was a previous smoker for 8 months <1ppd in the 1970s. She denies of any alcohol or illicit drug use. She currently works as a cook at [employer]. She denies of any hormone use.        Family History   Problem Relation Age of Onset   • Cancer Father      lung   • Atrial fibrillation Mother    • Heart attack Sister    • Heart attack Brother    • Breast cancer Neg Hx          Medications:  Reviewed  Currently on Eliquis      Review of Systems  Constitutional: No fever, chills, night sweats or weight loss.   HEENT:  No headaches, vision changes or hearing changes,  no sinus drainage.   Cardiovascular:  No palpitations, chest pain, syncopal episodes or edema.   Pulmonary:  No shortness of breath, no hemoptysis, no cough.   Gastrointestinal:  No nausea or vomiting. No constipation, no diarrhea, no melena or hematochezia   Genitourinary:  No hematuria, or changes in urination.   Musculoskeletal:  +arthritic pain  Skin: No rashes or pruritus.   Endocrine:  No hot flashes or chills   Hematologic:  No easy bleeding, +bruising since being on Eliquis  Immunologic:  No allergies or frequent infections.   Neurologic: No numbness, tingling, or weakness.   Psychiatric:  No anxiety or depression.         Physical Exam  Vital Signs: These were reviewed and listed as per patient’s electronic medical chart  Vitals:    18 1038   BP: 164/78   Pulse: 71   Resp: 18   Temp: 98.1 °F (36.7 °C)   SpO2: 100%     General: Awake, alert and oriented, in no distress   HEENT: Head is atraumatic, normocephalic, extraocular movements full, oropharynx clear, no scleral icterus, pink moist mucous membranes   Neck: supple, no jvd, lymphadenopathy or masses   Cardiovascular: regular rate and rhythm without murmurs, rubs or gallops   Pulmonary: clear to auscultation bilaterally, no wheezing  Abdomen: soft, non-tender, non-distended, normal active bowel sounds present, no organomegaly   Extremities: No clubbing, cyanosis or edema   Lymph: No cervical, supraclavicular adenopathy   Neurologic: Mental status as above, alert, awake and oriented, grossly non-focal exam         Labs / Studies:          IMAGIN16: CT PE Protocol:   The pulmonary arteries were densely opacified. The distribution of the contrast in the pulmonary arteries was remarkable for a filling defect in the right lower lobe pulmonary arteries consistent with thrombus. It is fairly small in size. The left pulmonary arteries also show a small area of thrombus in the lingular segment of the upper lobe. The aorta is normal in caliber.  IMPRESSION- Bilateral pulmonary emboli in the lungs.     02/19/16: LE Duplex bilateral: There is patent spontan eous flow from the common femoral vein through the posterior tibial veins. There was no internal clot or area of noncompressibility. Normal augmentation was elicited where applicable. IMPRESSION- No DVT in the lower extremities on today's exam.     02/19/16: Carotid duplex: IMPRESSION- No hemodynamically significant stenosis     02/27/16: CT Abdomen/Pelvis:  1. LARGE ANTERIOR ABDOMINAL WALL HERNIA. 2. ABUNDANT STOOL IN THE DISTAL COLON. 3. SCATTERED RIGHT LOWER LOBE PULMONARY NODULES OF UNCERTAIN ETIOLOGY. THIS COULD BE METASTATIC OR POTENTIALLY INFECTIOUS IN ETIOLOGY.      06/26/16: CT CHEST PULMONARY EMBOLISM WITH CONTRAST: On today's exam the pulmonary arteries enhance homogeneously. The clots seen on the previous CT and February have resolved. The aorta is normal in caliber. There continues to be multiple pulmonary parenchymal lung nodules in the lungs. These are predominantly in the 1cm size range. Radiographically they are stable in comparing with the earlier CT. IMPRESSION: No residual pulmonary emboli. There continue to be several noncalcified pulmonary parenchymal lung nodules in the right lung which are stable.      09/28/16: NM PET SKULL BASE TO MID THIGH: The CT portion of the exam shows some small pulmonary parenchymal lung nodules which have been relatively stable in comparing with earlier CT scans. These are most prominent in the right lung base. The largest nodule measures 11 mm. All the lung nodules are metabolically inactive. There was no glucose uptake in any of these lungnodules. There were no enlarged lymph nodes or areas of hypermetabolic activity in the mediastinum or supraclavicular areas. Below the diaphragm, the distribution of the fluorodeoxyglucose is physiologic. IMPRESSION: The lung nodules in the lung bases are metabolically inactive with no activity above  background.    02/06/17: CT CHEST WO CONTRAST: There is no mediastinal or hilar lymph node enlargement. Parenchymal nodules are again noted, primarily in the right lower lobe. These are stable from the previous study. IMPRESSION: Multiple parenchymal nodules, especially in the right lower lobe similar to the previous exam.     01/25/18: CT Chest WO CONTRAST: IMPRESSION: Stable appearance of the chest.       Assessment/Plan   Ms. Kaba is a very pleasant 61 year old   female who presents in followup appointment after hospital discharge at the request of Dr. Velasquez for further evaluation of bilateral pulmonary embolism and for hypercoagulable workup.      1. Bilateral Pulmonary Embolisms   She currently remains on Eliquis for anticoagulation and has been tolerating this well without any bleeding episodes. At this time patient' s pulmonary embolism appears to be unprovoked and therefore I did obtain a hypercoagulable workup to further evaluate which was negative. Patient should be on anticoagulation for at least a minimum of 6 months, however even though hypercoagulable workup was negative, her bilateral pulmonary emboli appear to have been unprovoked and therefore for this reason I would recommend life long anticoagulation as it is possible that she could have a life threatening recurrence. She understands the risks and side effects of anticoagulation, including that she could have a light threatening bleed while on anticoagulation and she was agreeable to current plan. Today however she asked if she could discontinue Eliquis due to insurance issues and I again have recommended life long anticoagulation due to unprovoked pulmonary emboli. We discussed what possible discontinuation means and she understands the possible risks and benefits of continuing anticoagulation versus self discontinuation. She reported that she wanted to stop Eliquis due to possible upcoming insurance issues and cost. Therefore I also  offered to change her anticoagulation to another agent that would be more affordable for her should she lose her insurance, however, she did not want to do this. Should she decide to self discontinue anticoagulation we discussed at least consideration of ASA 325mg daily. We also discussed to at least consider the Eliquis 2.5mg dosing BID which is the thromboprophylactic dose if patient was reluctant to continue 5mg BID dosing as this dose has also been shown to reduce the risk of recurrent thrombosis in patients. At present patient reports that while her insurance is still active will continue with Eliquis with same dose.         She understands the risk of bleeding while being on anticoagulation and should spontaneous or abnormal bleeding occur she understands to seek immediate medical attention. I also advised her       against high impact sports/activities while she on anticoagulation as she would be at increased risk of bleeding. I have also advised her to consider obtaining a medical band while she is on               anticoagulation so that if she were ever unconscious or in an accident this would alert paramedics that she is on anticoagulation. She was also advised on avoidance of NSAIDs as this could         increase the risk of bleeding while on Eliquis.   She was also advised of possible thrombosis symptoms (which include but not limited to symptoms of myocardial infarction, CVA, DVT, and pulmonary emboli) and she understands should these occur she should also seek immediate medical attention.     2. Pulmonary Nodules/Axillar LN  She is currently following with Dr. Guillen for possible bronchoscopy however has declined this and preferred to be followed with imaging. Repeat CT PE did not show change in pulmonary nodules with resolution of pulmonary emboli and PET scan along with recent repeat CT Chest again showed stability in nodules. She reports that she did undergo a mammogram which was negative. She  reports that she still has to schedule her colonoscopy. She recently found to have axillary LNs. However this was biopsied and negative for underlying malignancy and is to follow with ultrasounds with her primary provider.               3.  ACO Quality measures  Eva Kaba does not use tobacco products. She was a previous smoker and have encourage to continue to abstain.  Discussed the patient's BMI with her. BMI is above normal parameters. Follow-up plan includes:  exercise counseling and nutrition counseling.  Current outpatient and discharge medications have been reconciled for the patient.     I will have  Ms. Kaba return in follow up appointment as needed however she understands that should she have any questions or concerns she should give us a call at any time and I would be happy to see her. It was a pleasure to see Ms. Kaba in clinic today, thank you for allowing me to participate in the care of this patient.     I spent 30 minutes in regards to this patient's care today. More than 23 minutes of the time was spent in direct interaction with the patient discussing the above problems and her imaging results.         Eulalia Mims MD  03/26/18  3:22 PM

## 2019-01-03 ENCOUNTER — OFFICE VISIT (OUTPATIENT)
Dept: CARDIOLOGY | Facility: CLINIC | Age: 64
End: 2019-01-03

## 2019-01-03 VITALS
SYSTOLIC BLOOD PRESSURE: 153 MMHG | DIASTOLIC BLOOD PRESSURE: 85 MMHG | WEIGHT: 189.8 LBS | BODY MASS INDEX: 30.5 KG/M2 | RESPIRATION RATE: 18 BRPM | HEIGHT: 66 IN | HEART RATE: 66 BPM | OXYGEN SATURATION: 93 %

## 2019-01-03 DIAGNOSIS — I26.99 OTHER PULMONARY EMBOLISM WITHOUT ACUTE COR PULMONALE, UNSPECIFIED CHRONICITY (HCC): ICD-10-CM

## 2019-01-03 DIAGNOSIS — I10 ESSENTIAL HYPERTENSION: ICD-10-CM

## 2019-01-03 DIAGNOSIS — I47.1 SVT (SUPRAVENTRICULAR TACHYCARDIA) (HCC): Primary | ICD-10-CM

## 2019-01-03 PROCEDURE — 93000 ELECTROCARDIOGRAM COMPLETE: CPT | Performed by: NURSE PRACTITIONER

## 2019-01-03 PROCEDURE — 99214 OFFICE O/P EST MOD 30 MIN: CPT | Performed by: NURSE PRACTITIONER

## 2019-01-03 RX ORDER — ASPIRIN 325 MG
325 TABLET ORAL DAILY
COMMUNITY

## 2019-01-03 RX ORDER — LISINOPRIL 20 MG/1
20 TABLET ORAL DAILY
Qty: 90 TABLET | Refills: 3 | Status: SHIPPED | OUTPATIENT
Start: 2019-01-03 | End: 2020-01-17 | Stop reason: SDUPTHER

## 2019-01-03 NOTE — PROGRESS NOTES
Dolores Troy PA-C  Eva Kaba  1955 01/03/2019    Patient Active Problem List   Diagnosis   • Pulmonary embolism (CMS/HCC)   • Pulmonary nodules   • Seasonal allergies   • SVT (supraventricular tachycardia) (CMS/HCC)   • Abnormal CT scan   • Abscess of axilla       Dear Dolores Troy PA-C:    Subjective     Chief Complaint   Patient presents with   • Follow-up     SVT           History of Present Illness:    Eva Kaba is a 63 y.o. female with a history of AV kian reentrant tachycardia which developed after she was diagnosed with bilateral pulmonary emboli. She was evaluated with EP and ultimately felt this was the cause of her tachycardia. An ablation was deferred. Since that time she has been doing well. She denies chest pains, shortness of breath, palpitations, dizziness, and lightheadedness. She is no longer taking Eliquis. Lifelong anticoagulation was recommended by hematology. However, the patient has stopped the Eliquis due to cost. She states she is aware of the risk and has even discussed this with her hematologist. Her blood pressure is elevated today. She does not monitor routinely at home.           No Known Allergies:      Current Outpatient Medications:   •  albuterol (PROVENTIL HFA;VENTOLIN HFA) 108 (90 BASE) MCG/ACT inhaler, Inhale 2 puffs every 4 (four) hours as needed for wheezing., Disp: , Rfl:   •  aspirin 325 MG tablet, Take 325 mg by mouth Daily., Disp: , Rfl:   •  lisinopril (PRINIVIL,ZESTRIL) 20 MG tablet, Take 1 tablet by mouth Daily., Disp: 90 tablet, Rfl: 3  •  metoprolol tartrate (LOPRESSOR) 25 MG tablet, Take 1 tablet by mouth 2 (Two) Times a Day., Disp: 180 tablet, Rfl: 3  •  Multiple Vitamin (MULTI VITAMIN DAILY PO), Take  by mouth., Disp: , Rfl:   •  amoxicillin-clavulanate (AUGMENTIN) 875-125 MG per tablet, Take 1 tablet by mouth 2 (Two) Times a Day., Disp: 20 tablet, Rfl: 0  •  apixaban (ELIQUIS) 5 MG tablet tablet, Take 1 tablet by mouth 2 (Two) Times a Day., Disp: 180  "tablet, Rfl: 1  •  DOCQLACE 100 MG capsule, , Disp: , Rfl: 6  •  docusate calcium (SURFAK) 240 MG capsule, Take 240 mg by mouth 2 (two) times a day., Disp: , Rfl:   •  fluticasone (FLONASE) 50 MCG/ACT nasal spray, 2 sprays into each nostril Daily. Administer 2 sprays in each nostril for each dose., Disp: 1 bottle, Rfl: 2  •  hydrOXYzine (VISTARIL) 25 MG capsule, Take 25 mg by mouth 3 (Three) Times a Day As Needed for Itching., Disp: , Rfl:       The following portions of the patient's history were reviewed and updated as appropriate: allergies, current medications, past family history, past medical history, past social history, past surgical history and problem list.    Social History     Tobacco Use   • Smoking status: Former Smoker     Packs/day: 1.00   • Smokeless tobacco: Never Used   • Tobacco comment: 8 months   Substance Use Topics   • Alcohol use: No   • Drug use: No       Review of Systems   Constitution: Negative for weakness and malaise/fatigue.   Cardiovascular: Negative for chest pain, claudication, cyanosis, dyspnea on exertion, irregular heartbeat, leg swelling, near-syncope, orthopnea, palpitations, paroxysmal nocturnal dyspnea and syncope.   Respiratory: Negative for cough, shortness of breath and wheezing.    Neurological: Negative for dizziness, focal weakness, headaches, light-headedness and loss of balance.       Objective   Vitals:    01/03/19 0916   BP: 153/85   BP Location: Right arm   Patient Position: Sitting   Cuff Size: Adult   Pulse: 66   Resp: 18   SpO2: 93%   Weight: 86.1 kg (189 lb 12.8 oz)   Height: 167.6 cm (66\")     Body mass index is 30.63 kg/m².        Physical Exam   Constitutional: She is oriented to person, place, and time. She appears well-developed and well-nourished.   HENT:   Head: Normocephalic and atraumatic.   Neck: No JVD present.   Cardiovascular: Normal rate, regular rhythm and normal heart sounds. Exam reveals no S3 and no S4.   No murmur heard.  Pulmonary/Chest: " Effort normal and breath sounds normal. She has no wheezes. She has no rales.   Abdominal: Soft. Bowel sounds are normal.   Musculoskeletal: She exhibits no edema.   Neurological: She is alert and oriented to person, place, and time.   Skin: Skin is warm and dry.   Psychiatric: She has a normal mood and affect. Her behavior is normal.               ECG 12 Lead  Date/Time: 1/3/2019 9:22 AM  Performed by: Liz Garcia APRN  Authorized by: Liz Garcia APRN   Comparison: compared with previous ECG   Similar to previous ECG  Rhythm: sinus rhythm  BPM: 70  Comments: Chronic LAFB  Poor R wave progression in precordial leads              Assessment/Plan    Diagnosis Plan   1. SVT (supraventricular tachycardia) (CMS/HCC)  ECG 12 Lead    lisinopril (PRINIVIL,ZESTRIL) 20 MG tablet    metoprolol tartrate (LOPRESSOR) 25 MG tablet    Basic Metabolic Panel   2. Other pulmonary embolism without acute cor pulmonale, unspecified chronicity (CMS/HCC)  ECG 12 Lead   3. Essential hypertension                  Recommendations:    1. Her blood pressure is above goal. Will increase lisinopril to 20 mg daily. She will follow up with her PCP for hypertension.    2. BMP in one week    3. Continue current dose of metoprolol    4. Follow up in 1 year and PRN           Return in about 1 year (around 1/3/2020) for Recheck.    As always, I appreciate very much the opportunity to participate in the cardiovascular care of your patients.      With Best Regards,    RADHA Stanton

## 2019-01-18 ENCOUNTER — LAB (OUTPATIENT)
Dept: LAB | Facility: HOSPITAL | Age: 64
End: 2019-01-18

## 2019-01-18 DIAGNOSIS — I47.1 SVT (SUPRAVENTRICULAR TACHYCARDIA) (HCC): ICD-10-CM

## 2019-01-18 LAB
ANION GAP SERPL CALCULATED.3IONS-SCNC: 5.9 MMOL/L (ref 3.6–11.2)
BUN BLD-MCNC: 12 MG/DL (ref 7–21)
BUN/CREAT SERPL: 17.6 (ref 7–25)
CALCIUM SPEC-SCNC: 9.4 MG/DL (ref 7.7–10)
CHLORIDE SERPL-SCNC: 107 MMOL/L (ref 99–112)
CO2 SERPL-SCNC: 30.1 MMOL/L (ref 24.3–31.9)
CREAT BLD-MCNC: 0.68 MG/DL (ref 0.43–1.29)
GFR SERPL CREATININE-BSD FRML MDRD: 87 ML/MIN/1.73
GLUCOSE BLD-MCNC: 88 MG/DL (ref 70–110)
OSMOLALITY SERPL CALC.SUM OF ELEC: 284.2 MOSM/KG (ref 273–305)
POTASSIUM BLD-SCNC: 3.9 MMOL/L (ref 3.5–5.3)
SODIUM BLD-SCNC: 143 MMOL/L (ref 135–153)

## 2019-01-18 PROCEDURE — 80048 BASIC METABOLIC PNL TOTAL CA: CPT

## 2019-01-24 ENCOUNTER — TELEPHONE (OUTPATIENT)
Dept: CARDIOLOGY | Facility: CLINIC | Age: 64
End: 2019-01-24

## 2020-01-07 DIAGNOSIS — I47.1 SVT (SUPRAVENTRICULAR TACHYCARDIA) (HCC): ICD-10-CM

## 2020-01-17 ENCOUNTER — OFFICE VISIT (OUTPATIENT)
Dept: CARDIOLOGY | Facility: CLINIC | Age: 65
End: 2020-01-17

## 2020-01-17 VITALS
OXYGEN SATURATION: 97 % | DIASTOLIC BLOOD PRESSURE: 76 MMHG | BODY MASS INDEX: 30.76 KG/M2 | HEART RATE: 74 BPM | WEIGHT: 191.4 LBS | SYSTOLIC BLOOD PRESSURE: 142 MMHG | HEIGHT: 66 IN

## 2020-01-17 DIAGNOSIS — I10 ESSENTIAL HYPERTENSION: ICD-10-CM

## 2020-01-17 DIAGNOSIS — I47.1 SVT (SUPRAVENTRICULAR TACHYCARDIA) (HCC): Primary | ICD-10-CM

## 2020-01-17 DIAGNOSIS — M79.10 MYALGIA: ICD-10-CM

## 2020-01-17 PROCEDURE — 99213 OFFICE O/P EST LOW 20 MIN: CPT | Performed by: NURSE PRACTITIONER

## 2020-01-17 PROCEDURE — 93000 ELECTROCARDIOGRAM COMPLETE: CPT | Performed by: NURSE PRACTITIONER

## 2020-01-17 RX ORDER — NAPROXEN SODIUM 220 MG
220 TABLET ORAL 2 TIMES DAILY PRN
COMMUNITY
End: 2022-04-11 | Stop reason: ALTCHOICE

## 2020-01-17 RX ORDER — LISINOPRIL 20 MG/1
20 TABLET ORAL DAILY
Qty: 90 TABLET | Refills: 3 | Status: SHIPPED | OUTPATIENT
Start: 2020-01-17 | End: 2021-01-18 | Stop reason: SDUPTHER

## 2020-01-17 NOTE — PROGRESS NOTES
Dolores Troy PA-C  Eva Kaba  1955 01/17/2020    Patient Active Problem List   Diagnosis   • Pulmonary embolism (CMS/HCC)   • Pulmonary nodules   • Seasonal allergies   • SVT (supraventricular tachycardia) (CMS/HCC)   • Abnormal CT scan   • Abscess of axilla       Dear Dolores Troy PA-C:    Subjective     Chief Complaint   Patient presents with   • Follow-up     1 year f/up.    • Med Management     Verbal.            History of Present Illness:    Eva Kaba is a 64 y.o. female with a past medical history significant for paroxysmal SVT and hypertension.  She presents today for annual follow-up.  Reports she has been doing well.  She denies any chest pain, shortness breath, palpitations, dizziness, or lightheadedness.  She does report recently she has been having some bilateral leg cramping.  She is also had muscle spasm in her left paraspinal region.  This is worse with certain movements and alleviated with ice.  She has not recently been evaluated by her PCP.        No Known Allergies:      Current Outpatient Medications:   •  albuterol (PROVENTIL HFA;VENTOLIN HFA) 108 (90 BASE) MCG/ACT inhaler, Inhale 2 puffs every 4 (four) hours as needed for wheezing., Disp: , Rfl:   •  aspirin 325 MG tablet, Take 325 mg by mouth Daily., Disp: , Rfl:   •  fluticasone (FLONASE) 50 MCG/ACT nasal spray, 2 sprays into each nostril Daily. Administer 2 sprays in each nostril for each dose., Disp: 1 bottle, Rfl: 2  •  lisinopril (PRINIVIL,ZESTRIL) 20 MG tablet, Take 1 tablet by mouth Daily., Disp: 90 tablet, Rfl: 3  •  metoprolol tartrate (LOPRESSOR) 25 MG tablet, Take 1 tablet by mouth 2 (Two) Times a Day., Disp: 180 tablet, Rfl: 3  •  naproxen sodium (ALEVE) 220 MG tablet, Take 220 mg by mouth 2 (Two) Times a Day As Needed., Disp: , Rfl:       The following portions of the patient's history were reviewed and updated as appropriate: allergies, current medications, past family history, past medical history, past social  "history, past surgical history and problem list.    Social History     Tobacco Use   • Smoking status: Former Smoker     Packs/day: 1.00   • Smokeless tobacco: Never Used   • Tobacco comment: 8 months   Substance Use Topics   • Alcohol use: No   • Drug use: No       Review of Systems   Constitution: Negative for malaise/fatigue.   Cardiovascular: Negative for chest pain, dyspnea on exertion, irregular heartbeat, leg swelling, near-syncope, orthopnea, palpitations, paroxysmal nocturnal dyspnea and syncope.   Respiratory: Negative for cough, shortness of breath and wheezing.    Musculoskeletal: Positive for myalgias.   Neurological: Negative for dizziness, light-headedness and weakness.       Objective   Vitals:    01/17/20 1215   BP: 142/76   BP Location: Left arm   Patient Position: Sitting   Cuff Size: Adult   Pulse: 74   SpO2: 97%   Weight: 86.8 kg (191 lb 6.4 oz)   Height: 167.6 cm (66\")     Body mass index is 30.89 kg/m².        Physical Exam   Constitutional: She is oriented to person, place, and time. She appears well-developed and well-nourished.   HENT:   Head: Normocephalic and atraumatic.   Cardiovascular: Normal rate, regular rhythm and normal heart sounds. Exam reveals no S3 and no S4.   No murmur heard.  Pulmonary/Chest: Effort normal and breath sounds normal. She has no wheezes. She has no rales.   Abdominal: Soft. Bowel sounds are normal.   Musculoskeletal: She exhibits no edema.   Left paraspinal region tender to palpation     Neurological: She is alert and oriented to person, place, and time.   Skin: Skin is warm and dry.   Psychiatric: She has a normal mood and affect. Her behavior is normal.       Lab Results   Component Value Date     01/18/2019    K 3.9 01/18/2019     01/18/2019    CO2 30.1 01/18/2019    BUN 12 01/18/2019    CREATININE 0.68 01/18/2019    GLUCOSE 88 01/18/2019    CALCIUM 9.4 01/18/2019    AST 20 08/28/2017    ALT 16 08/28/2017    ALKPHOS 73 08/28/2017    LABIL2 1.5 " 05/23/2016     Lab Results   Component Value Date    CKTOTAL 52 03/07/2016     Lab Results   Component Value Date    WBC 7.88 08/28/2017    HGB 13.2 08/28/2017    HCT 39.7 08/28/2017     08/28/2017     Lab Results   Component Value Date    INR 0.94 05/23/2016    INR 0.94 03/07/2016    INR 0.96 02/27/2016     Lab Results   Component Value Date    MG 2.0 03/23/2016     Lab Results   Component Value Date    TSH 0.855 08/28/2017      Lab Results   Component Value Date    BNP 88 03/07/2016             ECG 12 Lead  Date/Time: 1/17/2020 12:10 PM  Performed by: Liz Garcia APRN  Authorized by: Liz Garcia APRN   Comparison: compared with previous ECG   Similar to previous ECG  Rhythm: sinus rhythm  BPM: 75  Conduction: left anterior fascicular block              Assessment/Plan    Diagnosis Plan   1. SVT (supraventricular tachycardia) (CMS/HCC)  metoprolol tartrate (LOPRESSOR) 25 MG tablet    Basic Metabolic Panel    Magnesium    lisinopril (PRINIVIL,ZESTRIL) 20 MG tablet   2. Essential hypertension  Basic Metabolic Panel    Magnesium   3. Myalgia                  Recommendations:    We will continue with current doses of lisinopril and metoprolol.    I have ordered a BMP and magnesium to evaluate her myalgias.  I have also encouraged her to schedule follow-up with her PCP to evaluate for possible musculoskeletal issues.    She will follow-up here in 1 year or sooner if needed.    Return in about 1 year (around 1/17/2021) for Recheck.    As always, I appreciate very much the opportunity to participate in the cardiovascular care of your patients.      With Best Regards,    RADHA Stanton

## 2020-03-06 ENCOUNTER — LAB (OUTPATIENT)
Dept: LAB | Facility: HOSPITAL | Age: 65
End: 2020-03-06

## 2020-03-06 DIAGNOSIS — I10 ESSENTIAL HYPERTENSION: ICD-10-CM

## 2020-03-06 DIAGNOSIS — I47.1 SVT (SUPRAVENTRICULAR TACHYCARDIA) (HCC): ICD-10-CM

## 2020-03-06 PROCEDURE — 83735 ASSAY OF MAGNESIUM: CPT

## 2020-03-06 PROCEDURE — 80048 BASIC METABOLIC PNL TOTAL CA: CPT

## 2020-03-06 PROCEDURE — 36415 COLL VENOUS BLD VENIPUNCTURE: CPT

## 2020-03-07 LAB
ANION GAP SERPL CALCULATED.3IONS-SCNC: 15.3 MMOL/L (ref 5–15)
BUN BLD-MCNC: 16 MG/DL (ref 8–23)
BUN/CREAT SERPL: 26.2 (ref 7–25)
CALCIUM SPEC-SCNC: 9.2 MG/DL (ref 8.6–10.5)
CHLORIDE SERPL-SCNC: 103 MMOL/L (ref 98–107)
CO2 SERPL-SCNC: 24.7 MMOL/L (ref 22–29)
CREAT BLD-MCNC: 0.61 MG/DL (ref 0.57–1)
GFR SERPL CREATININE-BSD FRML MDRD: 99 ML/MIN/1.73
GLUCOSE BLD-MCNC: 113 MG/DL (ref 65–99)
MAGNESIUM SERPL-MCNC: 2.2 MG/DL (ref 1.6–2.4)
POTASSIUM BLD-SCNC: 4.1 MMOL/L (ref 3.5–5.2)
SODIUM BLD-SCNC: 143 MMOL/L (ref 136–145)

## 2021-01-18 DIAGNOSIS — I47.1 SVT (SUPRAVENTRICULAR TACHYCARDIA) (HCC): ICD-10-CM

## 2021-01-19 RX ORDER — LISINOPRIL 20 MG/1
20 TABLET ORAL DAILY
Qty: 30 TABLET | Refills: 0 | Status: SHIPPED | OUTPATIENT
Start: 2021-01-19 | End: 2021-02-03 | Stop reason: SDUPTHER

## 2021-02-03 ENCOUNTER — OFFICE VISIT (OUTPATIENT)
Dept: CARDIOLOGY | Facility: CLINIC | Age: 66
End: 2021-02-03

## 2021-02-03 VITALS
TEMPERATURE: 98.1 F | SYSTOLIC BLOOD PRESSURE: 144 MMHG | HEIGHT: 66 IN | HEART RATE: 67 BPM | WEIGHT: 189.8 LBS | DIASTOLIC BLOOD PRESSURE: 74 MMHG | BODY MASS INDEX: 30.5 KG/M2

## 2021-02-03 DIAGNOSIS — I10 ESSENTIAL HYPERTENSION: ICD-10-CM

## 2021-02-03 DIAGNOSIS — I47.1 SVT (SUPRAVENTRICULAR TACHYCARDIA) (HCC): Primary | ICD-10-CM

## 2021-02-03 PROCEDURE — 99213 OFFICE O/P EST LOW 20 MIN: CPT | Performed by: NURSE PRACTITIONER

## 2021-02-03 PROCEDURE — 93000 ELECTROCARDIOGRAM COMPLETE: CPT | Performed by: NURSE PRACTITIONER

## 2021-02-03 RX ORDER — LISINOPRIL 20 MG/1
20 TABLET ORAL DAILY
Qty: 90 TABLET | Refills: 3 | Status: SHIPPED | OUTPATIENT
Start: 2021-02-03 | End: 2022-02-02 | Stop reason: SDUPTHER

## 2021-02-03 NOTE — PROGRESS NOTES
Dolores Troy PA-C  Eva Kaba  1955 02/03/2021    Patient Active Problem List   Diagnosis   • Pulmonary embolism (CMS/HCC)   • Pulmonary nodules   • Seasonal allergies   • SVT (supraventricular tachycardia) (CMS/HCC)   • Abnormal CT scan   • Abscess of axilla       Dear Dolores Troy PA-C:    Subjective     Chief Complaint   Patient presents with   • Follow-up     NO COMPLAINTS PER PATIENT   • Med Management     VERBAL           History of Present Illness:    Eva Kaba is a 65 y.o. female with a history of paroxysmal SVT and hypertension.  She presents today for routine cardiology follow-up.  Reports she has been doing well.  She denies any chest pain, shortness of breath, palpitations, dizziness, or lightheadedness.  Blood pressure today is slightly above goal.  Patient does not check this routinely at home.  He is tolerating her medications well and has not had any labs since her last lab draw 1 year ago.           No Known Allergies:      Current Outpatient Medications:   •  albuterol (PROVENTIL HFA;VENTOLIN HFA) 108 (90 BASE) MCG/ACT inhaler, Inhale 2 puffs every 4 (four) hours as needed for wheezing., Disp: , Rfl:   •  aspirin 325 MG tablet, Take 325 mg by mouth Daily., Disp: , Rfl:   •  fluticasone (FLONASE) 50 MCG/ACT nasal spray, 2 sprays into each nostril Daily. Administer 2 sprays in each nostril for each dose., Disp: 1 bottle, Rfl: 2  •  lisinopril (PRINIVIL,ZESTRIL) 20 MG tablet, Take 1 tablet by mouth Daily., Disp: 90 tablet, Rfl: 3  •  metoprolol tartrate (LOPRESSOR) 25 MG tablet, Take 1 tablet by mouth 2 (Two) Times a Day., Disp: 180 tablet, Rfl: 3  •  naproxen sodium (ALEVE) 220 MG tablet, Take 220 mg by mouth 2 (Two) Times a Day As Needed., Disp: , Rfl:       The following portions of the patient's history were reviewed and updated as appropriate: allergies, current medications, past family history, past medical history, past social history, past surgical history and problem  "list.    Social History     Tobacco Use   • Smoking status: Former Smoker     Packs/day: 1.00   • Smokeless tobacco: Never Used   • Tobacco comment: 8 months   Substance Use Topics   • Alcohol use: No   • Drug use: No       Review of Systems   Constitution: Negative for malaise/fatigue.   Cardiovascular: Negative for chest pain, dyspnea on exertion, irregular heartbeat, leg swelling, near-syncope, orthopnea, palpitations, paroxysmal nocturnal dyspnea and syncope.   Respiratory: Negative for cough, shortness of breath and wheezing.    Neurological: Negative for dizziness, light-headedness and weakness.       Objective   Vitals:    02/03/21 1428   BP: 144/74   Pulse: 67   Temp: 98.1 °F (36.7 °C)   Weight: 86.1 kg (189 lb 12.8 oz)   Height: 167.6 cm (66\")     Body mass index is 30.63 kg/m².        Vitals signs reviewed.   Constitutional:       Appearance: Healthy appearance. Well-developed and not in distress.   HENT:      Head: Normocephalic and atraumatic.   Neck:      Vascular: No JVD.   Pulmonary:      Effort: Pulmonary effort is normal.      Breath sounds: Normal breath sounds. No wheezing. No rales.   Cardiovascular:      Normal rate. Regular rhythm.      Murmurs: There is no murmur.      . No S3 and S4 gallop.   Edema:     Peripheral edema absent.   Abdominal:      General: Bowel sounds are normal.      Palpations: Abdomen is soft.   Skin:     General: Skin is warm and dry.   Neurological:      Mental Status: Alert, oriented to person, place, and time and oriented to person, place and time.   Psychiatric:         Mood and Affect: Mood normal.         Behavior: Behavior normal.         Lab Results   Component Value Date     03/06/2020    K 4.1 03/06/2020     03/06/2020    CO2 24.7 03/06/2020    BUN 16 03/06/2020    CREATININE 0.61 03/06/2020    GLUCOSE 113 (H) 03/06/2020    CALCIUM 9.2 03/06/2020    AST 20 08/28/2017    ALT 16 08/28/2017    ALKPHOS 73 08/28/2017    LABIL2 1.5 05/23/2016     Lab " Results   Component Value Date    CKTOTAL 52 03/07/2016     Lab Results   Component Value Date    WBC 7.88 08/28/2017    HGB 13.2 08/28/2017    HCT 39.7 08/28/2017     08/28/2017     Lab Results   Component Value Date    INR 0.94 05/23/2016    INR 0.94 03/07/2016    INR 0.96 02/27/2016     Lab Results   Component Value Date    MG 2.2 03/06/2020     Lab Results   Component Value Date    TSH 0.855 08/28/2017      Lab Results   Component Value Date    BNP 88 03/07/2016             ECG 12 Lead    Date/Time: 2/3/2021 2:28 PM  Performed by: Liz Garcia APRN  Authorized by: Liz Garcia APRN   Comparison: compared with previous ECG   Similar to previous ECG  Rhythm: sinus rhythm  BPM: 66  Conduction: left anterior fascicular block              Assessment/Plan    Diagnosis Plan   1. SVT (supraventricular tachycardia) (CMS/HCC)  ECG 12 Lead    lisinopril (PRINIVIL,ZESTRIL) 20 MG tablet    metoprolol tartrate (LOPRESSOR) 25 MG tablet    Basic Metabolic Panel   2. Essential hypertension  ECG 12 Lead    Basic Metabolic Panel                Recommendations:    1.  I have asked her to monitor her blood pressure routinely at home and let us know if BP is greater than 140/90 on average.    2.  We will continue with current dose of lisinopril metoprolol for now.    3.  BMP ordered.    4.  Follow-up in 1 year or sooner if needed.     Return in about 1 year (around 2/3/2022) for Recheck.    As always, I appreciate very much the opportunity to participate in the cardiovascular care of your patients.      With Best Regards,    RADHA Stanton

## 2021-02-12 ENCOUNTER — LAB (OUTPATIENT)
Dept: LAB | Facility: HOSPITAL | Age: 66
End: 2021-02-12

## 2021-02-12 DIAGNOSIS — I47.1 SVT (SUPRAVENTRICULAR TACHYCARDIA) (HCC): ICD-10-CM

## 2021-02-12 DIAGNOSIS — I10 ESSENTIAL HYPERTENSION: ICD-10-CM

## 2021-02-12 PROCEDURE — 36415 COLL VENOUS BLD VENIPUNCTURE: CPT

## 2021-02-12 PROCEDURE — 80048 BASIC METABOLIC PNL TOTAL CA: CPT

## 2021-02-13 LAB
ANION GAP SERPL CALCULATED.3IONS-SCNC: 11.5 MMOL/L (ref 5–15)
BUN SERPL-MCNC: 11 MG/DL (ref 8–23)
BUN/CREAT SERPL: 15.3 (ref 7–25)
CALCIUM SPEC-SCNC: 9.8 MG/DL (ref 8.6–10.5)
CHLORIDE SERPL-SCNC: 104 MMOL/L (ref 98–107)
CO2 SERPL-SCNC: 25.5 MMOL/L (ref 22–29)
CREAT SERPL-MCNC: 0.72 MG/DL (ref 0.57–1)
GFR SERPL CREATININE-BSD FRML MDRD: 81 ML/MIN/1.73
GLUCOSE SERPL-MCNC: 83 MG/DL (ref 65–99)
POTASSIUM SERPL-SCNC: 4.2 MMOL/L (ref 3.5–5.2)
SODIUM SERPL-SCNC: 141 MMOL/L (ref 136–145)

## 2021-12-03 ENCOUNTER — HOSPITAL ENCOUNTER (OUTPATIENT)
Dept: INFUSION THERAPY | Facility: HOSPITAL | Age: 66
Discharge: HOME | End: 2021-12-03
Admitting: NURSE PRACTITIONER

## 2021-12-03 VITALS
OXYGEN SATURATION: 96 % | RESPIRATION RATE: 18 BRPM | HEART RATE: 78 BPM | DIASTOLIC BLOOD PRESSURE: 69 MMHG | SYSTOLIC BLOOD PRESSURE: 122 MMHG | TEMPERATURE: 98.1 F

## 2021-12-03 DIAGNOSIS — U07.1 COVID-19: Primary | ICD-10-CM

## 2021-12-03 PROCEDURE — 25010000002 INJECTION, CASIRIVIMAB AND IMDEVIMAB, 1200 MG: Performed by: NURSE PRACTITIONER

## 2021-12-03 PROCEDURE — M0243 CASIRIVI AND IMDEVI INFUSION: HCPCS | Performed by: NURSE PRACTITIONER

## 2021-12-03 RX ORDER — METHYLPREDNISOLONE SODIUM SUCCINATE 125 MG/2ML
125 INJECTION, POWDER, LYOPHILIZED, FOR SOLUTION INTRAMUSCULAR; INTRAVENOUS AS NEEDED
Status: DISCONTINUED | OUTPATIENT
Start: 2021-12-03 | End: 2021-12-05 | Stop reason: HOSPADM

## 2021-12-03 RX ORDER — DIPHENHYDRAMINE HYDROCHLORIDE 50 MG/ML
50 INJECTION INTRAMUSCULAR; INTRAVENOUS ONCE AS NEEDED
Status: DISCONTINUED | OUTPATIENT
Start: 2021-12-03 | End: 2021-12-05 | Stop reason: HOSPADM

## 2021-12-03 RX ORDER — DIPHENHYDRAMINE HCL 50 MG
50 CAPSULE ORAL ONCE AS NEEDED
Status: DISCONTINUED | OUTPATIENT
Start: 2021-12-03 | End: 2021-12-05 | Stop reason: HOSPADM

## 2021-12-03 RX ORDER — DIPHENHYDRAMINE HYDROCHLORIDE 50 MG/ML
50 INJECTION INTRAMUSCULAR; INTRAVENOUS ONCE AS NEEDED
Status: CANCELLED | OUTPATIENT
Start: 2021-12-03

## 2021-12-03 RX ORDER — EPINEPHRINE 1 MG/ML
0.3 INJECTION, SOLUTION INTRAMUSCULAR; SUBCUTANEOUS AS NEEDED
Status: CANCELLED | OUTPATIENT
Start: 2021-12-03

## 2021-12-03 RX ORDER — EPINEPHRINE 1 MG/ML
0.3 INJECTION, SOLUTION INTRAMUSCULAR; SUBCUTANEOUS AS NEEDED
Status: DISCONTINUED | OUTPATIENT
Start: 2021-12-03 | End: 2021-12-05 | Stop reason: HOSPADM

## 2021-12-03 RX ORDER — METHYLPREDNISOLONE SODIUM SUCCINATE 125 MG/2ML
125 INJECTION, POWDER, LYOPHILIZED, FOR SOLUTION INTRAMUSCULAR; INTRAVENOUS AS NEEDED
Status: CANCELLED | OUTPATIENT
Start: 2021-12-03

## 2021-12-03 RX ORDER — DIPHENHYDRAMINE HCL 50 MG
50 CAPSULE ORAL ONCE AS NEEDED
Status: CANCELLED | OUTPATIENT
Start: 2021-12-03

## 2021-12-03 RX ADMIN — CASIRIVIMAB AND IMDEVIMAB: 600; 600 INJECTION, SOLUTION, CONCENTRATE INTRAVENOUS at 13:40

## 2022-02-02 ENCOUNTER — OFFICE VISIT (OUTPATIENT)
Dept: CARDIOLOGY | Facility: CLINIC | Age: 67
End: 2022-02-02

## 2022-02-02 VITALS
DIASTOLIC BLOOD PRESSURE: 79 MMHG | WEIGHT: 185.2 LBS | RESPIRATION RATE: 16 BRPM | BODY MASS INDEX: 29.77 KG/M2 | SYSTOLIC BLOOD PRESSURE: 167 MMHG | HEIGHT: 66 IN | HEART RATE: 67 BPM | TEMPERATURE: 98 F

## 2022-02-02 DIAGNOSIS — I10 ESSENTIAL HYPERTENSION: ICD-10-CM

## 2022-02-02 DIAGNOSIS — I47.1 SVT (SUPRAVENTRICULAR TACHYCARDIA): Primary | ICD-10-CM

## 2022-02-02 PROCEDURE — 93000 ELECTROCARDIOGRAM COMPLETE: CPT | Performed by: NURSE PRACTITIONER

## 2022-02-02 PROCEDURE — 99214 OFFICE O/P EST MOD 30 MIN: CPT | Performed by: NURSE PRACTITIONER

## 2022-02-02 RX ORDER — LISINOPRIL 40 MG/1
40 TABLET ORAL DAILY
Qty: 90 TABLET | Refills: 3 | Status: SHIPPED | OUTPATIENT
Start: 2022-02-02 | End: 2022-02-18 | Stop reason: SDUPTHER

## 2022-02-02 NOTE — PROGRESS NOTES
Dolores Troy PA-C  Eva Kaba  1955 02/02/2022    Patient Active Problem List   Diagnosis   • Pulmonary embolism (HCC)   • Pulmonary nodules   • Seasonal allergies   • SVT (supraventricular tachycardia) (HCC)   • Abnormal CT scan   • Abscess of axilla   • COVID-19       Dear Dolores Troy PA-C:    Subjective     Chief Complaint   Patient presents with   • Palpitations     SVT, 1 year follow   • Med Management     verbal           History of Present Illness:    Eva Kaba is a 66 y.o. female with a past medical history of paroxysmal SVT and hypertension.  She presents today for routine cardiology follow-up.  She reports in December she had COVID. She received IV infusion and recovered well. She denies any shortness of breath or chest pains. She does have some fatigue. She denies any palpitaitons. She hasn't been checking her BP regularly at home.          No Known Allergies:      Current Outpatient Medications:   •  albuterol (PROVENTIL HFA;VENTOLIN HFA) 108 (90 BASE) MCG/ACT inhaler, Inhale 2 puffs every 4 (four) hours as needed for wheezing., Disp: , Rfl:   •  aspirin 325 MG tablet, Take 325 mg by mouth Daily., Disp: , Rfl:   •  fluticasone (FLONASE) 50 MCG/ACT nasal spray, 2 sprays into each nostril Daily. Administer 2 sprays in each nostril for each dose., Disp: 1 bottle, Rfl: 2  •  lisinopril (PRINIVIL,ZESTRIL) 40 MG tablet, Take 1 tablet by mouth Daily., Disp: 90 tablet, Rfl: 3  •  metoprolol tartrate (LOPRESSOR) 25 MG tablet, Take 1 tablet by mouth 2 (Two) Times a Day., Disp: 180 tablet, Rfl: 3  •  naproxen sodium (ALEVE) 220 MG tablet, Take 220 mg by mouth 2 (Two) Times a Day As Needed., Disp: , Rfl:       The following portions of the patient's history were reviewed and updated as appropriate: allergies, current medications, past family history, past medical history, past social history, past surgical history and problem list.    Social History     Tobacco Use   • Smoking status: Former Smoker      "Packs/day: 1.00   • Smokeless tobacco: Never Used   • Tobacco comment: 8 months   Substance Use Topics   • Alcohol use: No   • Drug use: No       ROS    Objective   Vitals:    02/02/22 1253   BP: 167/79   Pulse: 67   Resp: 16   Temp: 98 °F (36.7 °C)   Weight: 84 kg (185 lb 3.2 oz)   Height: 167.6 cm (66\")     Body mass index is 29.89 kg/m².        Vitals reviewed.   Constitutional:       Appearance: Healthy appearance. Well-developed and not in distress.   HENT:      Head: Normocephalic and atraumatic.   Neck:      Vascular: No JVD.   Pulmonary:      Effort: Pulmonary effort is normal.      Breath sounds: Normal breath sounds. No wheezing. No rales.   Cardiovascular:      Normal rate. Regular rhythm.      Murmurs: There is no murmur.      . No S3 and S4 gallop.   Edema:     Peripheral edema absent.   Abdominal:      General: Bowel sounds are normal.      Palpations: Abdomen is soft.   Skin:     General: Skin is warm and dry.   Neurological:      Mental Status: Alert, oriented to person, place, and time and oriented to person, place and time.   Psychiatric:         Mood and Affect: Mood normal.         Behavior: Behavior normal.         Lab Results   Component Value Date     02/12/2021    K 4.2 02/12/2021     02/12/2021    CO2 25.5 02/12/2021    BUN 11 02/12/2021    CREATININE 0.72 02/12/2021    GLUCOSE 83 02/12/2021    CALCIUM 9.8 02/12/2021    AST 20 08/28/2017    ALT 16 08/28/2017    ALKPHOS 73 08/28/2017    LABIL2 1.5 05/23/2016     Lab Results   Component Value Date    CKTOTAL 52 03/07/2016     Lab Results   Component Value Date    WBC 7.88 08/28/2017    HGB 13.2 08/28/2017    HCT 39.7 08/28/2017     08/28/2017     Lab Results   Component Value Date    INR 0.94 05/23/2016    INR 0.94 03/07/2016    INR 0.96 02/27/2016     Lab Results   Component Value Date    MG 2.2 03/06/2020     Lab Results   Component Value Date    TSH 0.855 08/28/2017      Lab Results   Component Value Date    BNP 88 " 03/07/2016             ECG 12 Lead    Date/Time: 2/2/2022 12:54 PM  Performed by: Liz Garcia APRN  Authorized by: Liz Garcia APRN   Comparison: compared with previous ECG   Similar to previous ECG  Rhythm: sinus rhythm  BPM: 69                Assessment/Plan    Diagnosis Plan   1. SVT (supraventricular tachycardia) (HCC)  ECG 12 Lead    lisinopril (PRINIVIL,ZESTRIL) 40 MG tablet    metoprolol tartrate (LOPRESSOR) 25 MG tablet   2. Essential hypertension  ECG 12 Lead    Basic Metabolic Panel                Recommendations:    1. Paroxysmal SVT - no recurrence. Continue current dose of metoprolol.  2. Essential hypertension - elevated in the office today. Will increase lisinopril to 40 mg daily and monitor for improvement. BMP in one week.  3. Follow up in 2 months or sooner if needed.         Return in about 2 months (around 4/2/2022) for Recheck.    As always, I appreciate very much the opportunity to participate in the cardiovascular care of your patients.      With Best Regards,    RADHA Stanton

## 2022-02-14 ENCOUNTER — TELEPHONE (OUTPATIENT)
Dept: CARDIOLOGY | Facility: CLINIC | Age: 67
End: 2022-02-14

## 2022-02-14 NOTE — TELEPHONE ENCOUNTER
On pts most recent visit the Lisinopril was increased to 40MG QD but is unable to take this higher dose due to side effects of extreme fatigue & head confusion. She is needing to see if this would be switched back to lower dose or if an alternative could be sent to pharmacy, please advise.

## 2022-02-18 ENCOUNTER — LAB (OUTPATIENT)
Dept: LAB | Facility: HOSPITAL | Age: 67
End: 2022-02-18

## 2022-02-18 DIAGNOSIS — I10 ESSENTIAL HYPERTENSION: Primary | ICD-10-CM

## 2022-02-18 DIAGNOSIS — I47.1 SVT (SUPRAVENTRICULAR TACHYCARDIA): ICD-10-CM

## 2022-02-18 DIAGNOSIS — I10 ESSENTIAL HYPERTENSION: ICD-10-CM

## 2022-02-18 PROCEDURE — 80048 BASIC METABOLIC PNL TOTAL CA: CPT

## 2022-02-18 PROCEDURE — 36415 COLL VENOUS BLD VENIPUNCTURE: CPT

## 2022-02-18 RX ORDER — AMLODIPINE BESYLATE 5 MG/1
5 TABLET ORAL DAILY
Qty: 30 TABLET | Refills: 11 | Status: SHIPPED | OUTPATIENT
Start: 2022-02-18 | End: 2022-10-11

## 2022-02-18 RX ORDER — LISINOPRIL 20 MG/1
20 TABLET ORAL DAILY
Qty: 90 TABLET | Refills: 3 | Status: SHIPPED | OUTPATIENT
Start: 2022-02-18

## 2022-02-19 LAB
ANION GAP SERPL CALCULATED.3IONS-SCNC: 11.5 MMOL/L (ref 5–15)
BUN SERPL-MCNC: 10 MG/DL (ref 8–23)
BUN/CREAT SERPL: 18.5 (ref 7–25)
CALCIUM SPEC-SCNC: 9.3 MG/DL (ref 8.6–10.5)
CHLORIDE SERPL-SCNC: 102 MMOL/L (ref 98–107)
CO2 SERPL-SCNC: 27.5 MMOL/L (ref 22–29)
CREAT SERPL-MCNC: 0.54 MG/DL (ref 0.57–1)
GFR SERPL CREATININE-BSD FRML MDRD: 113 ML/MIN/1.73
GLUCOSE SERPL-MCNC: 138 MG/DL (ref 65–99)
POTASSIUM SERPL-SCNC: 3.6 MMOL/L (ref 3.5–5.2)
SODIUM SERPL-SCNC: 141 MMOL/L (ref 136–145)

## 2022-04-11 ENCOUNTER — OFFICE VISIT (OUTPATIENT)
Dept: CARDIOLOGY | Facility: CLINIC | Age: 67
End: 2022-04-11

## 2022-04-11 VITALS
HEART RATE: 70 BPM | DIASTOLIC BLOOD PRESSURE: 78 MMHG | TEMPERATURE: 97.5 F | HEIGHT: 66 IN | BODY MASS INDEX: 30.37 KG/M2 | SYSTOLIC BLOOD PRESSURE: 141 MMHG | WEIGHT: 189 LBS

## 2022-04-11 DIAGNOSIS — I10 ESSENTIAL HYPERTENSION: ICD-10-CM

## 2022-04-11 DIAGNOSIS — I47.1 SVT (SUPRAVENTRICULAR TACHYCARDIA): Primary | ICD-10-CM

## 2022-04-11 PROCEDURE — 99213 OFFICE O/P EST LOW 20 MIN: CPT | Performed by: NURSE PRACTITIONER

## 2022-04-11 RX ORDER — PHENAZOPYRIDINE HYDROCHLORIDE 100 MG/1
TABLET, FILM COATED ORAL
COMMUNITY
End: 2022-10-11 | Stop reason: ALTCHOICE

## 2022-04-11 RX ORDER — GUAIFENESIN 600 MG/1
TABLET, EXTENDED RELEASE ORAL
COMMUNITY

## 2022-04-11 RX ORDER — SULFAMETHOXAZOLE AND TRIMETHOPRIM 800; 160 MG/1; MG/1
TABLET ORAL
COMMUNITY
End: 2022-10-11

## 2022-04-11 RX ORDER — MECLIZINE HYDROCHLORIDE 25 MG/1
TABLET ORAL
COMMUNITY

## 2022-04-11 RX ORDER — ONDANSETRON 4 MG/1
TABLET, FILM COATED ORAL
COMMUNITY

## 2022-04-11 RX ORDER — FAMOTIDINE 20 MG/1
TABLET, FILM COATED ORAL
COMMUNITY
End: 2022-10-11 | Stop reason: ALTCHOICE

## 2022-04-11 RX ORDER — AMOXICILLIN AND CLAVULANATE POTASSIUM 875; 125 MG/1; MG/1
TABLET, FILM COATED ORAL
COMMUNITY
End: 2022-10-11

## 2022-04-11 NOTE — PROGRESS NOTES
Dolores Troy PA-C  Eva Kaba  1955 04/11/2022    Patient Active Problem List   Diagnosis   • Pulmonary embolism (HCC)   • Pulmonary nodules   • Seasonal allergies   • SVT (supraventricular tachycardia) (HCC)   • Abnormal CT scan   • Abscess of axilla   • COVID-19       Dear Dolores Troy PA-C:    Subjective     Chief Complaint   Patient presents with   • Follow-up     ROUTINE           History of Present Illness:    Eva Kaba is a 66 y.o. female with a past medical history of paroxysmal SVT and hypertension.  She presents today for routine cardiology follow-up.  She denies any chest pains, shortness of breath, or palpitations.  Previously, her blood pressure was elevated.  Lisinopril was increased to 40 mg daily but she could not tolerate this due to side effects.  That she continue the lisinopril 20 mg daily and amlodipine 5 mg daily was added.  Since this time, her blood pressure has been very well controlled at home.  Her BP has been in the 120 systolic and 80s diastolic.  Her BP is much improved in the office today.  She denies any issues with the medication.          No Known Allergies:      Current Outpatient Medications:   •  albuterol (PROVENTIL HFA;VENTOLIN HFA) 108 (90 BASE) MCG/ACT inhaler, Inhale 2 puffs every 4 (four) hours as needed for wheezing., Disp: , Rfl:   •  amLODIPine (NORVASC) 5 MG tablet, Take 1 tablet by mouth Daily., Disp: 30 tablet, Rfl: 11  •  aspirin 325 MG tablet, Take 325 mg by mouth Daily., Disp: , Rfl:   •  fluticasone (FLONASE) 50 MCG/ACT nasal spray, 2 sprays into each nostril Daily. Administer 2 sprays in each nostril for each dose., Disp: 1 bottle, Rfl: 2  •  guaiFENesin (MUCINEX) 600 MG 12 hr tablet, Mucus Relief  mg tablet, extended release  TAKE ONE TABLET BY MOUTH TWO TIMES A DAY FOR SINUS CONGESTION, Disp: , Rfl:   •  lisinopril (PRINIVIL,ZESTRIL) 20 MG tablet, Take 1 tablet by mouth Daily., Disp: 90 tablet, Rfl: 3  •  meclizine (ANTIVERT) 25 MG tablet,  "meclizine 25 mg tablet  TAKE ONE TABLET BY MOUTH TWO TIMES A DAY FOR DIZZINESS, Disp: , Rfl:   •  metoprolol tartrate (LOPRESSOR) 25 MG tablet, Take 1 tablet by mouth 2 (Two) Times a Day., Disp: 180 tablet, Rfl: 3  •  ondansetron (ZOFRAN) 4 MG tablet, ondansetron HCl 4 mg tablet  TAKE 1 TO 2 TABLETS BY MOUTH THREE TIMES A DAY FOR NAUSEA & VOMITING, Disp: , Rfl:   •  amoxicillin-clavulanate (AUGMENTIN) 875-125 MG per tablet, amoxicillin 875 mg-potassium clavulanate 125 mg tablet  TAKE ONE TABLET BY MOUTH TWO TIMES A DAY UNTIL ALL GONE FOR INFECTION, Disp: , Rfl:   •  famotidine (PEPCID) 20 MG tablet, famotidine 20 mg tablet  TAKE ONE TABLET BY MOUTH EVERY DAY FOR THE STOMACH, Disp: , Rfl:   •  phenazopyridine (PYRIDIUM) 100 MG tablet, phenazopyridine 100 mg tablet  TAKE ONE TABLET BY MOUTH THREE TIMES A DAY WITH MEALS AS DIRECTED, Disp: , Rfl:   •  sulfamethoxazole-trimethoprim (BACTRIM DS,SEPTRA DS) 800-160 MG per tablet, Bactrim  mg-160 mg tablet  take 1 tablet by oral route every 12 hours for 10 days, Disp: , Rfl:       The following portions of the patient's history were reviewed and updated as appropriate: allergies, current medications, past family history, past medical history, past social history, past surgical history and problem list.    Social History     Tobacco Use   • Smoking status: Former Smoker     Packs/day: 1.00   • Smokeless tobacco: Never Used   • Tobacco comment: 8 months   Substance Use Topics   • Alcohol use: No   • Drug use: No       ROS    Objective   Vitals:    04/11/22 1034   BP: 141/78   Pulse: 70   Temp: 97.5 °F (36.4 °C)   Weight: 85.7 kg (189 lb)   Height: 167.6 cm (66\")     Body mass index is 30.51 kg/m².        Vitals reviewed.   Constitutional:       Appearance: Healthy appearance. Well-developed and not in distress.   HENT:      Head: Normocephalic and atraumatic.   Neck:      Vascular: No JVD.   Pulmonary:      Effort: Pulmonary effort is normal.      Breath sounds: Normal " breath sounds. No wheezing. No rales.   Cardiovascular:      Normal rate. Regular rhythm.      Murmurs: There is no murmur.      . No S3 and S4 gallop.   Edema:     Peripheral edema absent.   Abdominal:      General: Bowel sounds are normal.      Palpations: Abdomen is soft.   Skin:     General: Skin is warm and dry.   Neurological:      Mental Status: Alert, oriented to person, place, and time and oriented to person, place and time.   Psychiatric:         Mood and Affect: Mood normal.         Behavior: Behavior normal.         Lab Results   Component Value Date     02/18/2022    K 3.6 02/18/2022     02/18/2022    CO2 27.5 02/18/2022    BUN 10 02/18/2022    CREATININE 0.54 (L) 02/18/2022    GLUCOSE 138 (H) 02/18/2022    CALCIUM 9.3 02/18/2022    AST 20 08/28/2017    ALT 16 08/28/2017    ALKPHOS 73 08/28/2017    LABIL2 1.5 05/23/2016     Lab Results   Component Value Date    CKTOTAL 52 03/07/2016     Lab Results   Component Value Date    WBC 7.88 08/28/2017    HGB 13.2 08/28/2017    HCT 39.7 08/28/2017     08/28/2017     Lab Results   Component Value Date    INR 0.94 05/23/2016    INR 0.94 03/07/2016    INR 0.96 02/27/2016     Lab Results   Component Value Date    MG 2.2 03/06/2020     Lab Results   Component Value Date    TSH 0.855 08/28/2017      Lab Results   Component Value Date    BNP 88 03/07/2016           Procedures      Assessment/Plan    Diagnosis Plan   1. SVT (supraventricular tachycardia) (HCC)     2. Essential hypertension                  Recommendations:    1. SVT-no recent palpitations.  We will continue with current dose of metoprolol.  2. Essential hypertension-BP improving and reportedly well controlled at home.  We will continue with current dose of lisinopril and amlodipine.  We will continue to monitor.  3. Follow-up in 6 months or sooner if needed.        Return in about 6 months (around 10/11/2022) for Recheck.    As always, I appreciate very much the opportunity to  participate in the cardiovascular care of your patients.      With Best Regards,    RADHA Stanton

## 2022-10-11 ENCOUNTER — OFFICE VISIT (OUTPATIENT)
Dept: CARDIOLOGY | Facility: CLINIC | Age: 67
End: 2022-10-11

## 2022-10-11 VITALS
DIASTOLIC BLOOD PRESSURE: 85 MMHG | WEIGHT: 187.6 LBS | BODY MASS INDEX: 30.15 KG/M2 | SYSTOLIC BLOOD PRESSURE: 159 MMHG | HEART RATE: 62 BPM | HEIGHT: 66 IN

## 2022-10-11 DIAGNOSIS — I10 ESSENTIAL HYPERTENSION: Primary | ICD-10-CM

## 2022-10-11 DIAGNOSIS — I47.1 SVT (SUPRAVENTRICULAR TACHYCARDIA): ICD-10-CM

## 2022-10-11 PROCEDURE — 99213 OFFICE O/P EST LOW 20 MIN: CPT | Performed by: NURSE PRACTITIONER

## 2022-10-11 PROCEDURE — 93000 ELECTROCARDIOGRAM COMPLETE: CPT | Performed by: NURSE PRACTITIONER

## 2022-10-11 RX ORDER — DIPHENOXYLATE HYDROCHLORIDE AND ATROPINE SULFATE 2.5; .025 MG/1; MG/1
TABLET ORAL DAILY
COMMUNITY

## 2022-10-11 RX ORDER — ERGOCALCIFEROL (VITAMIN D2) 10 MCG
400 TABLET ORAL DAILY
COMMUNITY

## 2022-10-11 RX ORDER — HYDROCHLOROTHIAZIDE 25 MG/1
25 TABLET ORAL DAILY
Qty: 30 TABLET | Refills: 2 | Status: SHIPPED | OUTPATIENT
Start: 2022-10-11 | End: 2023-01-23

## 2022-10-11 NOTE — PROGRESS NOTES
Dolores Troy PA-C  Eva Kaba  1955  10/11/2022    Patient Active Problem List   Diagnosis   • Pulmonary embolism (HCC)   • Pulmonary nodules   • Seasonal allergies   • SVT (supraventricular tachycardia) (HCC)   • Abnormal CT scan   • Abscess of axilla   • COVID-19       Dear Dolores Troy PA-C:    Subjective     Chief Complaint   Patient presents with   • Follow-up     ROUTINE-NO COMPLAINTS           History of Present Illness:    Eva Kaba is a 67 y.o. female with a past medical history of paroxysmal SVT and hypertension.  She presents today for routine cardiology follow-up.  Previously, lisinopril was increased to 40 mg daily.  However, she reports this caused her to feel bad and so she decrease lisinopril back to 20 mg daily.  She states she also discontinued amlodipine because it caused lower extremity edema which is now resolved.  She reports she is feeling well.  She denies any chest pains, shortness of breath, or palpitations.          No Known Allergies:      Current Outpatient Medications:   •  albuterol (PROVENTIL HFA;VENTOLIN HFA) 108 (90 BASE) MCG/ACT inhaler, Inhale 2 puffs every 4 (four) hours as needed for wheezing., Disp: , Rfl:   •  aspirin 325 MG tablet, Take 325 mg by mouth Daily., Disp: , Rfl:   •  fluticasone (FLONASE) 50 MCG/ACT nasal spray, 2 sprays into each nostril Daily. Administer 2 sprays in each nostril for each dose., Disp: 1 bottle, Rfl: 2  •  guaiFENesin (MUCINEX) 600 MG 12 hr tablet, Mucus Relief  mg tablet, extended release  TAKE ONE TABLET BY MOUTH TWO TIMES A DAY FOR SINUS CONGESTION, Disp: , Rfl:   •  lisinopril (PRINIVIL,ZESTRIL) 20 MG tablet, Take 1 tablet by mouth Daily., Disp: 90 tablet, Rfl: 3  •  meclizine (ANTIVERT) 25 MG tablet, meclizine 25 mg tablet  TAKE ONE TABLET BY MOUTH TWO TIMES A DAY FOR DIZZINESS, Disp: , Rfl:   •  metoprolol tartrate (LOPRESSOR) 25 MG tablet, Take 1 tablet by mouth 2 (Two) Times a Day., Disp: 180 tablet, Rfl: 3  •   "multivitamin (MULTI VITAMIN DAILY PO), Take  by mouth Daily., Disp: , Rfl:   •  ondansetron (ZOFRAN) 4 MG tablet, ondansetron HCl 4 mg tablet  TAKE 1 TO 2 TABLETS BY MOUTH THREE TIMES A DAY FOR NAUSEA & VOMITING, Disp: , Rfl:   •  Vitamin D, Cholecalciferol, (CHOLECALCIFEROL) 10 MCG (400 UNIT) tablet, Take 1 tablet by mouth Daily., Disp: , Rfl:   •  hydroCHLOROthiazide (HYDRODIURIL) 25 MG tablet, Take 1 tablet by mouth Daily., Disp: 30 tablet, Rfl: 2      The following portions of the patient's history were reviewed and updated as appropriate: allergies, current medications, past family history, past medical history, past social history, past surgical history and problem list.    Social History     Tobacco Use   • Smoking status: Former     Packs/day: 1.00     Types: Cigarettes   • Smokeless tobacco: Never   • Tobacco comments:     8 months   Substance Use Topics   • Alcohol use: No   • Drug use: No       Review of Systems   Constitutional: Negative for decreased appetite and malaise/fatigue.   Cardiovascular: Negative for chest pain, dyspnea on exertion and palpitations.   Respiratory: Negative for cough and shortness of breath.        Objective   Vitals:    10/11/22 1051   BP: 159/85   Pulse: 62   Weight: 85.1 kg (187 lb 9.6 oz)   Height: 167.6 cm (66\")     Body mass index is 30.28 kg/m².        Vitals reviewed.   Constitutional:       Appearance: Healthy appearance. Well-developed and not in distress.   HENT:      Head: Normocephalic and atraumatic.   Neck:      Vascular: No JVD.   Pulmonary:      Effort: Pulmonary effort is normal.      Breath sounds: Normal breath sounds. No wheezing. No rales.   Cardiovascular:      Normal rate. Regular rhythm.      Murmurs: There is no murmur.      . No S3 and S4 gallop.   Edema:     Peripheral edema absent.   Abdominal:      General: Bowel sounds are normal.      Palpations: Abdomen is soft.   Skin:     General: Skin is warm and dry.   Neurological:      Mental Status: " Alert, oriented to person, place, and time and oriented to person, place and time.   Psychiatric:         Mood and Affect: Mood normal.         Behavior: Behavior normal.         Lab Results   Component Value Date     02/18/2022    K 3.6 02/18/2022     02/18/2022    CO2 27.5 02/18/2022    BUN 10 02/18/2022    CREATININE 0.54 (L) 02/18/2022    GLUCOSE 138 (H) 02/18/2022    CALCIUM 9.3 02/18/2022    AST 20 08/28/2017    ALT 16 08/28/2017    ALKPHOS 73 08/28/2017    LABIL2 1.5 05/23/2016     Lab Results   Component Value Date    CKTOTAL 52 03/07/2016     Lab Results   Component Value Date    WBC 7.88 08/28/2017    HGB 13.2 08/28/2017    HCT 39.7 08/28/2017     08/28/2017     Lab Results   Component Value Date    INR 0.94 05/23/2016    INR 0.94 03/07/2016    INR 0.96 02/27/2016     Lab Results   Component Value Date    MG 2.2 03/06/2020     Lab Results   Component Value Date    TSH 0.855 08/28/2017      Lab Results   Component Value Date    BNP 88 03/07/2016             ECG 12 Lead    Date/Time: 10/11/2022 10:44 AM  Performed by: Liz Garcia APRN  Authorized by: Liz Garcia APRN   Comparison: compared with previous ECG   Similar to previous ECG  Rhythm: sinus rhythm  BPM: 65  Conduction: left anterior fascicular block              Assessment & Plan    Diagnosis Plan   1. Essential hypertension  ECG 12 Lead    hydroCHLOROthiazide (HYDRODIURIL) 25 MG tablet    Basic Metabolic Panel      2. SVT (supraventricular tachycardia) (HCC)  ECG 12 Lead                   Recommendations:    1. Essential hypertension-uncontrolled.  We will add hydrochlorothiazide 25 mg daily.  Continue lisinopril 20 mg daily and metoprolol tartrate 25 mg twice daily.  Did ask her to complete a BMP in 1 week.  2. SVT-no known recurrence.  Continue to monitor.  3. Follow-up in 2 months or sooner if needed.        Return in about 2 months (around 12/11/2022) for Recheck.    As always, I appreciate very much the  opportunity to participate in the cardiovascular care of your patients.      With Best Regards,    RADHA Stanton

## 2022-10-31 ENCOUNTER — LAB (OUTPATIENT)
Dept: LAB | Facility: HOSPITAL | Age: 67
End: 2022-10-31

## 2022-10-31 PROCEDURE — 80048 BASIC METABOLIC PNL TOTAL CA: CPT | Performed by: NURSE PRACTITIONER

## 2022-11-01 ENCOUNTER — TELEPHONE (OUTPATIENT)
Dept: CARDIOLOGY | Facility: CLINIC | Age: 67
End: 2022-11-01

## 2022-12-12 ENCOUNTER — OFFICE VISIT (OUTPATIENT)
Dept: CARDIOLOGY | Facility: CLINIC | Age: 67
End: 2022-12-12

## 2022-12-12 VITALS
HEART RATE: 60 BPM | WEIGHT: 187.2 LBS | DIASTOLIC BLOOD PRESSURE: 82 MMHG | BODY MASS INDEX: 30.08 KG/M2 | HEIGHT: 66 IN | SYSTOLIC BLOOD PRESSURE: 141 MMHG

## 2022-12-12 DIAGNOSIS — I10 ESSENTIAL HYPERTENSION: Primary | ICD-10-CM

## 2022-12-12 DIAGNOSIS — I47.1 SVT (SUPRAVENTRICULAR TACHYCARDIA): ICD-10-CM

## 2022-12-12 PROCEDURE — 99213 OFFICE O/P EST LOW 20 MIN: CPT | Performed by: NURSE PRACTITIONER

## 2022-12-12 NOTE — PROGRESS NOTES
Dolores Troy PA-C  Eva Kaba  1955 12/12/2022    Patient Active Problem List   Diagnosis   • Pulmonary embolism (HCC)   • Pulmonary nodules   • Seasonal allergies   • SVT (supraventricular tachycardia) (HCC)   • Abnormal CT scan   • Abscess of axilla   • COVID-19       Dear Dolores Troy PA-C:    Subjective     Chief Complaint   Patient presents with   • Follow-up     ROUTINE           History of Present Illness:    Eva Kaba is a 67 y.o. female with a past medical history of paroxysmal SVT and hypertension.  She presents today for routine cardiology follow-up.  Since adding HCTZ, reports her blood pressures been well controlled.  BP is usually in the 130s over 80s.  Although, she reports she has recently been eating more pork and sodium.  She has noticed when she eats more sodium her blood pressure is slightly higher.  She denies any chest pains, shortness of breath, or palpitations.          No Known Allergies:      Current Outpatient Medications:   •  albuterol (PROVENTIL HFA;VENTOLIN HFA) 108 (90 BASE) MCG/ACT inhaler, Inhale 2 puffs every 4 (four) hours as needed for wheezing., Disp: , Rfl:   •  aspirin 325 MG tablet, Take 325 mg by mouth Daily., Disp: , Rfl:   •  fluticasone (FLONASE) 50 MCG/ACT nasal spray, 2 sprays into each nostril Daily. Administer 2 sprays in each nostril for each dose., Disp: 1 bottle, Rfl: 2  •  guaiFENesin (MUCINEX) 600 MG 12 hr tablet, Mucus Relief  mg tablet, extended release  TAKE ONE TABLET BY MOUTH TWO TIMES A DAY FOR SINUS CONGESTION, Disp: , Rfl:   •  hydroCHLOROthiazide (HYDRODIURIL) 25 MG tablet, Take 1 tablet by mouth Daily., Disp: 30 tablet, Rfl: 2  •  lisinopril (PRINIVIL,ZESTRIL) 20 MG tablet, Take 1 tablet by mouth Daily., Disp: 90 tablet, Rfl: 3  •  meclizine (ANTIVERT) 25 MG tablet, meclizine 25 mg tablet  TAKE ONE TABLET BY MOUTH TWO TIMES A DAY FOR DIZZINESS, Disp: , Rfl:   •  metoprolol tartrate (LOPRESSOR) 25 MG tablet, Take 1 tablet by mouth 2  "(Two) Times a Day., Disp: 180 tablet, Rfl: 3  •  multivitamin (THERAGRAN) tablet tablet, Take  by mouth Daily., Disp: , Rfl:   •  ondansetron (ZOFRAN) 4 MG tablet, ondansetron HCl 4 mg tablet  TAKE 1 TO 2 TABLETS BY MOUTH THREE TIMES A DAY FOR NAUSEA & VOMITING, Disp: , Rfl:   •  Vitamin D, Cholecalciferol, (CHOLECALCIFEROL) 10 MCG (400 UNIT) tablet, Take 1 tablet by mouth Daily., Disp: , Rfl:       The following portions of the patient's history were reviewed and updated as appropriate: allergies, current medications, past family history, past medical history, past social history, past surgical history and problem list.    Social History     Tobacco Use   • Smoking status: Former     Packs/day: 1.00     Types: Cigarettes   • Smokeless tobacco: Never   • Tobacco comments:     8 months   Substance Use Topics   • Alcohol use: No   • Drug use: No       Review of Systems   Constitutional: Negative for decreased appetite and malaise/fatigue.   Cardiovascular: Negative for chest pain, dyspnea on exertion and palpitations.   Respiratory: Negative for cough and shortness of breath.        Objective   Vitals:    12/12/22 1023 12/12/22 1107   BP: 141/82    Pulse: 102 60   Weight: 84.9 kg (187 lb 3.2 oz)    Height: 167.6 cm (66\")      Body mass index is 30.21 kg/m².        Vitals reviewed.   Constitutional:       Appearance: Healthy appearance. Well-developed and not in distress.   HENT:      Head: Normocephalic and atraumatic.   Neck:      Vascular: No JVD.   Pulmonary:      Effort: Pulmonary effort is normal.      Breath sounds: Normal breath sounds. No wheezing. No rales.   Cardiovascular:      Normal rate. Regular rhythm.      Murmurs: There is no murmur.      . No S3 and S4 gallop.   Edema:     Peripheral edema absent.   Abdominal:      General: Bowel sounds are normal.      Palpations: Abdomen is soft.   Skin:     General: Skin is warm and dry.   Neurological:      Mental Status: Alert, oriented to person, place, and " time and oriented to person, place and time.   Psychiatric:         Mood and Affect: Mood normal.         Behavior: Behavior normal.         Lab Results   Component Value Date     10/31/2022    K 3.9 10/31/2022     10/31/2022    CO2 31.6 (H) 10/31/2022    BUN 11 10/31/2022    CREATININE 0.70 10/31/2022    GLUCOSE 115 (H) 10/31/2022    CALCIUM 9.9 10/31/2022    AST 20 08/28/2017    ALT 16 08/28/2017    ALKPHOS 73 08/28/2017    LABIL2 1.5 05/23/2016     Lab Results   Component Value Date    CKTOTAL 52 03/07/2016     Lab Results   Component Value Date    WBC 7.88 08/28/2017    HGB 13.2 08/28/2017    HCT 39.7 08/28/2017     08/28/2017     Lab Results   Component Value Date    INR 0.94 05/23/2016    INR 0.94 03/07/2016    INR 0.96 02/27/2016     Lab Results   Component Value Date    MG 2.2 03/06/2020     Lab Results   Component Value Date    TSH 0.855 08/28/2017      Lab Results   Component Value Date    BNP 88 03/07/2016           Procedures      Assessment & Plan    Diagnosis Plan   1. Essential hypertension        2. SVT (supraventricular tachycardia) (HCC)                     Recommendations:    1. Essential hypertension- continue lisinopril, HCTZ, and metoprolol.  I did ask her to decrease her sodium intake and monitor BP at home.  If BP is running greater than 140/90 will plan to increase lisinopril.  2. SVT-no known recurrence.  Patient denies palpitations.  3. Follow-up in 6 months or sooner if needed.        Return in about 6 months (around 6/12/2023) for Recheck.    As always, I appreciate very much the opportunity to participate in the cardiovascular care of your patients.      With Best Regards,    RADHA Stanton

## 2023-01-16 ENCOUNTER — TRANSCRIBE ORDERS (OUTPATIENT)
Dept: ADMINISTRATIVE | Facility: HOSPITAL | Age: 68
End: 2023-01-16
Payer: MEDICARE

## 2023-01-16 DIAGNOSIS — H50.111 MONOCULAR EXOTROPIA OF RIGHT EYE: Primary | ICD-10-CM

## 2023-01-23 DIAGNOSIS — I10 ESSENTIAL HYPERTENSION: ICD-10-CM

## 2023-01-23 RX ORDER — HYDROCHLOROTHIAZIDE 25 MG/1
TABLET ORAL
Qty: 30 TABLET | Refills: 2 | Status: SHIPPED | OUTPATIENT
Start: 2023-01-23 | End: 2023-02-07

## 2023-02-07 DIAGNOSIS — I47.1 SVT (SUPRAVENTRICULAR TACHYCARDIA): ICD-10-CM

## 2023-02-07 DIAGNOSIS — I10 ESSENTIAL HYPERTENSION: ICD-10-CM

## 2023-02-07 RX ORDER — HYDROCHLOROTHIAZIDE 25 MG/1
TABLET ORAL
Qty: 30 TABLET | Refills: 2 | Status: SHIPPED | OUTPATIENT
Start: 2023-02-07

## 2023-03-31 ENCOUNTER — HOSPITAL ENCOUNTER (OUTPATIENT)
Dept: MAMMOGRAPHY | Facility: HOSPITAL | Age: 68
Discharge: HOME | End: 2023-03-31
Admitting: PHYSICIAN ASSISTANT
Payer: MEDICARE

## 2023-03-31 DIAGNOSIS — Z12.31 VISIT FOR SCREENING MAMMOGRAM: ICD-10-CM

## 2023-03-31 PROCEDURE — 77067 SCR MAMMO BI INCL CAD: CPT | Performed by: RADIOLOGY

## 2023-03-31 PROCEDURE — 77063 BREAST TOMOSYNTHESIS BI: CPT

## 2023-03-31 PROCEDURE — 77063 BREAST TOMOSYNTHESIS BI: CPT | Performed by: RADIOLOGY

## 2023-03-31 PROCEDURE — 77067 SCR MAMMO BI INCL CAD: CPT

## 2023-06-12 ENCOUNTER — OFFICE VISIT (OUTPATIENT)
Dept: CARDIOLOGY | Facility: CLINIC | Age: 68
End: 2023-06-12
Payer: MEDICARE

## 2023-06-12 VITALS
RESPIRATION RATE: 18 BRPM | SYSTOLIC BLOOD PRESSURE: 135 MMHG | HEIGHT: 66 IN | WEIGHT: 186.2 LBS | BODY MASS INDEX: 29.92 KG/M2 | HEART RATE: 67 BPM | OXYGEN SATURATION: 98 % | DIASTOLIC BLOOD PRESSURE: 76 MMHG

## 2023-06-12 DIAGNOSIS — I47.1 SVT (SUPRAVENTRICULAR TACHYCARDIA): ICD-10-CM

## 2023-06-12 DIAGNOSIS — M79.10 MYALGIA: ICD-10-CM

## 2023-06-12 DIAGNOSIS — I10 ESSENTIAL HYPERTENSION: Primary | ICD-10-CM

## 2023-06-12 PROCEDURE — 93000 ELECTROCARDIOGRAM COMPLETE: CPT | Performed by: NURSE PRACTITIONER

## 2023-06-12 PROCEDURE — 99214 OFFICE O/P EST MOD 30 MIN: CPT | Performed by: NURSE PRACTITIONER

## 2023-06-12 PROCEDURE — 1159F MED LIST DOCD IN RCRD: CPT | Performed by: NURSE PRACTITIONER

## 2023-06-12 PROCEDURE — 1160F RVW MEDS BY RX/DR IN RCRD: CPT | Performed by: NURSE PRACTITIONER

## 2023-06-12 RX ORDER — CRANBERRY CONC/C/BACILL COAG 250-30-15
TABLET ORAL
COMMUNITY

## 2023-06-12 RX ORDER — CETIRIZINE HYDROCHLORIDE 5 MG/1
5 TABLET ORAL DAILY
COMMUNITY

## 2023-06-12 RX ORDER — MULTIVIT WITH MINERALS/LUTEIN
250 TABLET ORAL DAILY
COMMUNITY

## 2023-06-12 NOTE — PROGRESS NOTES
Dolores Troy PA-C  Eva Kaba  1955 06/12/2023    Patient Active Problem List   Diagnosis    Pulmonary embolism    Pulmonary nodules    Seasonal allergies    SVT (supraventricular tachycardia)    Abnormal CT scan    Abscess of axilla    COVID-19       Dear Dolores Troy PA-C:    Subjective     Chief Complaint   Patient presents with    Follow-up     6 month           History of Present Illness:    Eva Kaba is a 68 y.o. female with a past medical history of hypertension and paroxysmal SVT.  Blood pressure has been well controlled.The patient denies chest pain, shortness of breath, palpitations, dizziness, lightheadedness, near syncope, and syncope.  She continues to have muscle cramping in her lower legs.  She has been taking only 12.5 mg of HCTZ every other day but has not noticed any improvement since making the change in medication.  She drinks about 48 ounces of water every day.            No Known Allergies:      Current Outpatient Medications:     albuterol (PROVENTIL HFA;VENTOLIN HFA) 108 (90 BASE) MCG/ACT inhaler, Inhale 2 puffs Every 4 (Four) Hours As Needed for Wheezing., Disp: , Rfl:     aspirin 325 MG tablet, Take 1 tablet by mouth Daily., Disp: , Rfl:     cetirizine (zyrTEC) 5 MG tablet, Take 1 tablet by mouth Daily., Disp: , Rfl:     Cranberry-Vitamin C-Probiotic (AZO Cranberry) 250-30 MG tablet, Take  by mouth., Disp: , Rfl:     fluticasone (FLONASE) 50 MCG/ACT nasal spray, 2 sprays into each nostril Daily. Administer 2 sprays in each nostril for each dose., Disp: 1 bottle, Rfl: 2    guaiFENesin (MUCINEX) 600 MG 12 hr tablet, Mucus Relief  mg tablet, extended release  TAKE ONE TABLET BY MOUTH TWO TIMES A DAY FOR SINUS CONGESTION, Disp: , Rfl:     hydroCHLOROthiazide (HYDRODIURIL) 25 MG tablet, TAKE ONE TABLET BY MOUTH EVERY DAY AS DIRECTED, Disp: 30 tablet, Rfl: 2    lisinopril (PRINIVIL,ZESTRIL) 20 MG tablet, Take 1 tablet by mouth Daily., Disp: 90 tablet, Rfl: 3    meclizine  "(ANTIVERT) 25 MG tablet, meclizine 25 mg tablet  TAKE ONE TABLET BY MOUTH TWO TIMES A DAY FOR DIZZINESS, Disp: , Rfl:     metoprolol tartrate (LOPRESSOR) 25 MG tablet, TAKE ONE TABLET BY MOUTH TWO TIMES A DAY FOR BLOOD PRESSURE, Disp: 180 tablet, Rfl: 3    multivitamin (THERAGRAN) tablet tablet, Take  by mouth Daily., Disp: , Rfl:     ondansetron (ZOFRAN) 4 MG tablet, ondansetron HCl 4 mg tablet  TAKE 1 TO 2 TABLETS BY MOUTH THREE TIMES A DAY FOR NAUSEA & VOMITING, Disp: , Rfl:     vitamin C (ASCORBIC ACID) 250 MG tablet, Take 1 tablet by mouth Daily., Disp: , Rfl:     Vitamin D, Cholecalciferol, (CHOLECALCIFEROL) 10 MCG (400 UNIT) tablet, Take 1 tablet by mouth Daily., Disp: , Rfl:       The following portions of the patient's history were reviewed and updated as appropriate: allergies, current medications, past family history, past medical history, past social history, past surgical history and problem list.    Social History     Tobacco Use    Smoking status: Former     Packs/day: 1.00     Types: Cigarettes    Smokeless tobacco: Never    Tobacco comments:     8 months   Vaping Use    Vaping Use: Never used   Substance Use Topics    Alcohol use: No    Drug use: No       Review of Systems   Constitutional: Negative for decreased appetite and malaise/fatigue.   Cardiovascular:  Negative for chest pain, dyspnea on exertion and palpitations.   Respiratory:  Negative for cough and shortness of breath.    Musculoskeletal:  Positive for muscle cramps. Negative for muscle weakness.     Objective   Vitals:    06/12/23 0955   BP: 135/76   BP Location: Right arm   Patient Position: Sitting   Cuff Size: Adult   Pulse: 67   Resp: 18   SpO2: 98%   Weight: 84.5 kg (186 lb 3.2 oz)   Height: 167.6 cm (66\")     Body mass index is 30.05 kg/m².        Vitals reviewed.   Constitutional:       Appearance: Healthy appearance. Well-developed and not in distress.   HENT:      Head: Normocephalic and atraumatic.   Neck:      Vascular: No " carotid bruit or JVD.   Pulmonary:      Effort: Pulmonary effort is normal.      Breath sounds: Normal breath sounds. No wheezing. No rales.   Cardiovascular:      Normal rate. Regular rhythm.      Murmurs: There is no murmur.      . No S3 and S4 gallop.   Pulses:     Dorsalis pedis: 2+ bilaterally.     Posterior tibial: 2+ bilaterally.  Edema:     Peripheral edema absent.   Abdominal:      General: Bowel sounds are normal.      Palpations: Abdomen is soft.   Skin:     General: Skin is warm and dry.   Neurological:      Mental Status: Alert, oriented to person, place, and time and oriented to person, place and time.   Psychiatric:         Mood and Affect: Mood normal.         Behavior: Behavior normal.       Lab Results   Component Value Date     10/31/2022    K 3.9 10/31/2022     10/31/2022    CO2 31.6 (H) 10/31/2022    BUN 11 10/31/2022    CREATININE 0.70 10/31/2022    GLUCOSE 115 (H) 10/31/2022    CALCIUM 9.9 10/31/2022    AST 20 08/28/2017    ALT 16 08/28/2017    ALKPHOS 73 08/28/2017    LABIL2 1.5 05/23/2016     Lab Results   Component Value Date    CKTOTAL 52 03/07/2016     Lab Results   Component Value Date    WBC 7.88 08/28/2017    HGB 13.2 08/28/2017    HCT 39.7 08/28/2017     08/28/2017     Lab Results   Component Value Date    INR 0.94 05/23/2016    INR 0.94 03/07/2016    INR 0.96 02/27/2016     Lab Results   Component Value Date    MG 2.2 03/06/2020     Lab Results   Component Value Date    TSH 0.855 08/28/2017      Lab Results   Component Value Date    BNP 88 03/07/2016             ECG 12 Lead    Date/Time: 6/12/2023 9:58 AM  Performed by: Liz Garcia APRN  Authorized by: Liz Garcia APRN   Comparison: compared with previous ECG from 10/11/2022  Similar to previous ECG  Rhythm: sinus rhythm  BPM: 79  Conduction: left anterior fascicular block  Q waves: II, III, aVF, V3, V4, V5 and V6          Assessment & Plan    Diagnosis Plan   1. Essential hypertension  ECG 12 Lead     CBC & Differential    Comprehensive Metabolic Panel    TSH    Magnesium    CK      2. SVT (supraventricular tachycardia)  ECG 12 Lead    CBC & Differential    Comprehensive Metabolic Panel    TSH    Magnesium    CK      3. Myalgia  ECG 12 Lead    CBC & Differential    Comprehensive Metabolic Panel    TSH    Magnesium    CK                   Recommendations:    Essential hypertension-BP well controlled.  Continue lisinopril and metoprolol.  Paroxysmal SVT-no recent palpitations.  Myalgias/muscle cramping-we will order CBC, CMP, TSH, magnesium level, and CK to evaluate further.  Follow-up in 6 months or sooner if needed.        Return in about 6 months (around 12/12/2023) for Recheck.    As always, I appreciate very much the opportunity to participate in the cardiovascular care of your patients.      With Best Regards,    RADHA Stanton

## 2023-06-16 ENCOUNTER — LAB (OUTPATIENT)
Dept: LAB | Facility: HOSPITAL | Age: 68
End: 2023-06-16
Payer: MEDICARE

## 2023-06-16 PROCEDURE — 83735 ASSAY OF MAGNESIUM: CPT | Performed by: NURSE PRACTITIONER

## 2023-06-16 PROCEDURE — 80053 COMPREHEN METABOLIC PANEL: CPT | Performed by: NURSE PRACTITIONER

## 2023-06-16 PROCEDURE — 82550 ASSAY OF CK (CPK): CPT | Performed by: NURSE PRACTITIONER

## 2023-06-16 PROCEDURE — 84443 ASSAY THYROID STIM HORMONE: CPT | Performed by: NURSE PRACTITIONER

## 2023-06-16 PROCEDURE — 85025 COMPLETE CBC W/AUTO DIFF WBC: CPT | Performed by: NURSE PRACTITIONER

## 2023-08-07 DIAGNOSIS — I10 ESSENTIAL HYPERTENSION: ICD-10-CM

## 2023-08-07 RX ORDER — HYDROCHLOROTHIAZIDE 25 MG/1
TABLET ORAL
Qty: 30 TABLET | Refills: 2 | Status: SHIPPED | OUTPATIENT
Start: 2023-08-07

## 2023-11-03 DIAGNOSIS — I10 ESSENTIAL HYPERTENSION: ICD-10-CM

## 2023-11-03 DIAGNOSIS — I47.10 SVT (SUPRAVENTRICULAR TACHYCARDIA): ICD-10-CM

## 2023-11-03 RX ORDER — HYDROCHLOROTHIAZIDE 25 MG/1
25 TABLET ORAL DAILY
Qty: 90 TABLET | Refills: 0 | Status: SHIPPED | OUTPATIENT
Start: 2023-11-03

## 2023-11-03 NOTE — TELEPHONE ENCOUNTER
Caller: Eva Kaba    Relationship: Self    Best call back number: [phone]    Requested Prescriptions:   Requested Prescriptions     Pending Prescriptions Disp Refills    hydroCHLOROthiazide (HYDRODIURIL) 25 MG tablet 30 tablet 2     Sig: Take 1 tablet by mouth Daily.    metoprolol tartrate (LOPRESSOR) 25 MG tablet 180 tablet 3        Pharmacy where request should be sent: [facility] [address] - [phone]  - [phone] FX     Last office visit with prescribing clinician: 6/12/2023   Last telemedicine visit with prescribing clinician: Visit date not found   Next office visit with prescribing clinician: Visit date not found     Additional details provided by patient: PATIENT'S BOTTLE SAYS SHE DOES NOT HAVE ANY REFILLS     Does the patient have less than a 3 day supply:  [] Yes  [x] No    Would you like a call back once the refill request has been completed: [] Yes [] No    If the office needs to give you a call back, can they leave a voicemail: [] Yes [] No    Carol Ann Dumas Rep   11/03/23 09:56 EDT

## 2023-12-15 ENCOUNTER — OFFICE VISIT (OUTPATIENT)
Dept: CARDIOLOGY | Facility: CLINIC | Age: 68
End: 2023-12-15
Payer: MEDICARE

## 2023-12-15 VITALS
HEART RATE: 64 BPM | OXYGEN SATURATION: 93 % | DIASTOLIC BLOOD PRESSURE: 77 MMHG | BODY MASS INDEX: 29.15 KG/M2 | HEIGHT: 66 IN | SYSTOLIC BLOOD PRESSURE: 143 MMHG | WEIGHT: 181.4 LBS | RESPIRATION RATE: 18 BRPM

## 2023-12-15 DIAGNOSIS — I47.10 SVT (SUPRAVENTRICULAR TACHYCARDIA): ICD-10-CM

## 2023-12-15 DIAGNOSIS — I10 ESSENTIAL HYPERTENSION: ICD-10-CM

## 2023-12-15 PROCEDURE — 99213 OFFICE O/P EST LOW 20 MIN: CPT | Performed by: PHYSICIAN ASSISTANT

## 2023-12-15 PROCEDURE — 1159F MED LIST DOCD IN RCRD: CPT | Performed by: PHYSICIAN ASSISTANT

## 2023-12-15 PROCEDURE — 1160F RVW MEDS BY RX/DR IN RCRD: CPT | Performed by: PHYSICIAN ASSISTANT

## 2023-12-15 PROCEDURE — 3077F SYST BP >= 140 MM HG: CPT | Performed by: PHYSICIAN ASSISTANT

## 2023-12-15 PROCEDURE — 3078F DIAST BP <80 MM HG: CPT | Performed by: PHYSICIAN ASSISTANT

## 2023-12-15 RX ORDER — LISINOPRIL 20 MG/1
20 TABLET ORAL DAILY
Qty: 90 TABLET | Refills: 3 | Status: SHIPPED | OUTPATIENT
Start: 2023-12-15

## 2023-12-15 RX ORDER — HYDROCHLOROTHIAZIDE 25 MG/1
25 TABLET ORAL DAILY
Qty: 90 TABLET | Refills: 2 | Status: SHIPPED | OUTPATIENT
Start: 2023-12-15

## 2023-12-15 NOTE — PROGRESS NOTES
Kiara Kimble, APRN  Eva Kaba  1955  12/15/2023    Patient Active Problem List   Diagnosis    Pulmonary embolism    Pulmonary nodules    Seasonal allergies    SVT (supraventricular tachycardia)    Abnormal CT scan    Abscess of axilla    COVID-19    Essential hypertension       Dear Kiara Kimble, APRN:    Subjective     History of Present Illness:    Chief Complaint   Patient presents with    Essential hypertension       Eva Kaba is a pleasant 68 y.o. female with a past medical history significant for essential hypertension, PSVT, and remote history of pulmonary embolism (he has been unable to afford Eliquis).  She comes in today for cardiology follow-up.    Clinically Eva has no complaints today she does deny any chest pains or shortness of breath worsening from baseline.  She denies any bleeding issues with her aspirin she tells me she takes this in place of her Eliquis that she was unable to afford the co-pay with Eliquis.  She denies any palpitations, dizziness, or syncope.  She admits she has not been checking blood pressure regularly at home is borderline elevated today at 143/77.  She did recently have some labs all of which were unremarkable with exception of blood glucose level which was mildly elevated at 175 she was not fasting during these labs.  No recent lipid panel on file.    No Known Allergies:      Current Outpatient Medications:     albuterol (PROVENTIL HFA;VENTOLIN HFA) 108 (90 BASE) MCG/ACT inhaler, Inhale 2 puffs Every 4 (Four) Hours As Needed for Wheezing., Disp: , Rfl:     aspirin 325 MG tablet, Take 1 tablet by mouth Daily., Disp: , Rfl:     cetirizine (zyrTEC) 5 MG tablet, Take 1 tablet by mouth Daily., Disp: , Rfl:     Cranberry-Vitamin C-Probiotic (AZO Cranberry) 250-30 MG tablet, Take  by mouth., Disp: , Rfl:     fluticasone (FLONASE) 50 MCG/ACT nasal spray, 2 sprays into each nostril Daily. Administer 2 sprays in each nostril for each dose., Disp: 1 bottle, Rfl: 2     "guaiFENesin (MUCINEX) 600 MG 12 hr tablet, Mucus Relief  mg tablet, extended release  TAKE ONE TABLET BY MOUTH TWO TIMES A DAY FOR SINUS CONGESTION, Disp: , Rfl:     hydroCHLOROthiazide (HYDRODIURIL) 25 MG tablet, Take 1 tablet by mouth Daily., Disp: 90 tablet, Rfl: 2    lisinopril (PRINIVIL,ZESTRIL) 20 MG tablet, Take 1 tablet by mouth Daily. for blood pressure, Disp: 90 tablet, Rfl: 3    meclizine (ANTIVERT) 25 MG tablet, meclizine 25 mg tablet  TAKE ONE TABLET BY MOUTH TWO TIMES A DAY FOR DIZZINESS, Disp: , Rfl:     metoprolol tartrate (LOPRESSOR) 25 MG tablet, Take 1 tablet by mouth 2 (Two) Times a Day., Disp: 180 tablet, Rfl: 2    multivitamin (THERAGRAN) tablet tablet, Take  by mouth Daily., Disp: , Rfl:     ondansetron (ZOFRAN) 4 MG tablet, ondansetron HCl 4 mg tablet  TAKE 1 TO 2 TABLETS BY MOUTH THREE TIMES A DAY FOR NAUSEA & VOMITING, Disp: , Rfl:     vitamin C (ASCORBIC ACID) 250 MG tablet, Take 1 tablet by mouth Daily., Disp: , Rfl:     Vitamin D, Cholecalciferol, (CHOLECALCIFEROL) 10 MCG (400 UNIT) tablet, Take 1 tablet by mouth Daily., Disp: , Rfl:     The following portions of the patient's history were reviewed and updated as appropriate: allergies, current medications, past family history, past medical history, past social history, past surgical history and problem list.    Social History     Tobacco Use    Smoking status: Former     Packs/day: 1     Types: Cigarettes    Smokeless tobacco: Never    Tobacco comments:     8 months   Vaping Use    Vaping Use: Never used   Substance Use Topics    Alcohol use: No    Drug use: No         Objective   Vitals:    12/15/23 1042   BP: 143/77   BP Location: Right arm   Patient Position: Sitting   Cuff Size: Adult   Pulse: 64   Resp: 18   SpO2: 93%   Weight: 82.3 kg (181 lb 6.4 oz)   Height: 167.6 cm (66\")     Body mass index is 29.28 kg/m².    ROS    Constitutional:       General: Not in acute distress.     Appearance: Healthy appearance. Well-developed " and not in distress. Not diaphoretic.   Eyes:      Conjunctiva/sclera: Conjunctivae normal.      Pupils: Pupils are equal, round, and reactive to light.   HENT:      Head: Normocephalic and atraumatic.   Neck:      Vascular: No carotid bruit or JVD.   Pulmonary:      Effort: Pulmonary effort is normal. No respiratory distress.      Breath sounds: Normal breath sounds.   Cardiovascular:      Normal rate. Regular rhythm.   Edema:     Peripheral edema absent.   Skin:     General: Skin is cool.   Neurological:      Mental Status: Alert, oriented to person, place, and time and oriented to person, place and time.         Lab Results   Component Value Date     06/16/2023    K 3.9 06/16/2023     06/16/2023    CO2 26.2 06/16/2023    BUN 14 06/16/2023    CREATININE 0.74 06/16/2023    GLUCOSE 175 (H) 06/16/2023    CALCIUM 9.4 06/16/2023    AST 16 06/16/2023    ALT 17 06/16/2023    ALKPHOS 69 06/16/2023    LABIL2 1.5 05/23/2016     Lab Results   Component Value Date    CKTOTAL 160 06/16/2023     Lab Results   Component Value Date    WBC 8.47 06/16/2023    HGB 13.9 06/16/2023    HCT 43.5 06/16/2023     06/16/2023     Lab Results   Component Value Date    INR 0.94 05/23/2016    INR 0.94 03/07/2016    INR 0.96 02/27/2016     Lab Results   Component Value Date    MG 2.0 06/16/2023     Lab Results   Component Value Date    TSH 1.560 06/16/2023      Lab Results   Component Value Date    BNP 88 03/07/2016       During this visit the following were done:  Labs Reviewed []    Labs Ordered []    Radiology Reports Reviewed []    Radiology Ordered []    PCP Records Reviewed []    Referring Provider Records Reviewed []    ER Records Reviewed []    Hospital Records Reviewed []    History Obtained From Family []    Radiology Images Reviewed []    Other Reviewed []    Records Requested []       Procedures    Assessment & Plan    Diagnosis Plan   1. Essential hypertension  hydroCHLOROthiazide (HYDRODIURIL) 25 MG tablet       2. SVT (supraventricular tachycardia)  lisinopril (PRINIVIL,ZESTRIL) 20 MG tablet    metoprolol tartrate (LOPRESSOR) 25 MG tablet               Recommendations:  Essential hypertension  I discussed with her that goal blood pressure would be at minimum less than 140/80 it is above this today.  Discussed adjusting antihypertensives today but she would prefer to go home and monitor blood pressure and bring in blood pressure log.  Will request lipid panel from PCP  PSVT  Denies any recent recurrence      Return in about 6 months (around 6/15/2024).    As always, I appreciate very much the opportunity to participate in the cardiovascular care of your patients.      With Best Regards,    Tu Garcia PA-C

## 2023-12-18 ENCOUNTER — TELEPHONE (OUTPATIENT)
Dept: CARDIOLOGY | Facility: CLINIC | Age: 68
End: 2023-12-18
Payer: MEDICARE

## 2023-12-18 NOTE — TELEPHONE ENCOUNTER
----- Message from Tu Garcia PA-C sent at 12/15/2023 11:39 AM EST -----  Can we get lipid panel from pcp?    My wife My sister

## 2023-12-22 ENCOUNTER — TRANSCRIBE ORDERS (OUTPATIENT)
Dept: ADMINISTRATIVE | Facility: HOSPITAL | Age: 68
End: 2023-12-22
Payer: MEDICARE

## 2023-12-22 DIAGNOSIS — Z13.820 SCREENING FOR OSTEOPOROSIS: Primary | ICD-10-CM

## 2024-01-05 ENCOUNTER — HOSPITAL ENCOUNTER (OUTPATIENT)
Dept: BONE DENSITY | Facility: HOSPITAL | Age: 69
Discharge: HOME | End: 2024-01-05
Admitting: PHYSICIAN ASSISTANT
Payer: MEDICARE

## 2024-01-05 DIAGNOSIS — Z13.820 SCREENING FOR OSTEOPOROSIS: ICD-10-CM

## 2024-01-05 PROCEDURE — 77080 DXA BONE DENSITY AXIAL: CPT

## 2024-12-03 DIAGNOSIS — I10 ESSENTIAL HYPERTENSION: ICD-10-CM

## 2024-12-03 DIAGNOSIS — I47.10 SVT (SUPRAVENTRICULAR TACHYCARDIA): ICD-10-CM

## 2024-12-04 RX ORDER — LISINOPRIL 20 MG/1
20 TABLET ORAL DAILY
Qty: 90 TABLET | Refills: 0 | Status: SHIPPED | OUTPATIENT
Start: 2024-12-04

## 2024-12-04 RX ORDER — HYDROCHLOROTHIAZIDE 25 MG/1
25 TABLET ORAL DAILY
Qty: 90 TABLET | Refills: 0 | Status: SHIPPED | OUTPATIENT
Start: 2024-12-04

## 2024-12-04 RX ORDER — METOPROLOL TARTRATE 25 MG/1
TABLET, FILM COATED ORAL
Qty: 180 TABLET | Refills: 0 | Status: SHIPPED | OUTPATIENT
Start: 2024-12-04

## 2025-01-09 ENCOUNTER — OFFICE VISIT (OUTPATIENT)
Dept: CARDIOLOGY | Facility: CLINIC | Age: 70
End: 2025-01-09
Payer: MEDICARE

## 2025-01-09 VITALS
WEIGHT: 174.6 LBS | HEIGHT: 66 IN | HEART RATE: 62 BPM | SYSTOLIC BLOOD PRESSURE: 115 MMHG | BODY MASS INDEX: 28.06 KG/M2 | DIASTOLIC BLOOD PRESSURE: 66 MMHG | OXYGEN SATURATION: 97 %

## 2025-01-09 DIAGNOSIS — I47.10 SVT (SUPRAVENTRICULAR TACHYCARDIA): ICD-10-CM

## 2025-01-09 DIAGNOSIS — I10 ESSENTIAL HYPERTENSION: Primary | ICD-10-CM

## 2025-01-09 RX ORDER — PHENAZOPYRIDINE HYDROCHLORIDE 100 MG/1
TABLET, FILM COATED ORAL
COMMUNITY
Start: 2023-12-13

## 2025-01-09 RX ORDER — NITROGLYCERIN 0.4 MG/1
TABLET SUBLINGUAL
COMMUNITY

## 2025-01-09 RX ORDER — IBUPROFEN 800 MG/1
TABLET, FILM COATED ORAL
COMMUNITY

## 2025-01-09 RX ORDER — CYCLOBENZAPRINE HCL 5 MG
1 TABLET ORAL 3 TIMES DAILY PRN
COMMUNITY

## 2025-01-09 RX ORDER — PREDNISOLONE ACETATE 10 MG/ML
SUSPENSION/ DROPS OPHTHALMIC
COMMUNITY
Start: 2024-10-14

## 2025-01-09 RX ORDER — OFLOXACIN 3 MG/ML
SOLUTION/ DROPS OPHTHALMIC
COMMUNITY
Start: 2024-10-14

## 2025-01-09 RX ORDER — HYDROCODONE BITARTRATE AND ACETAMINOPHEN 5; 325 MG/1; MG/1
TABLET ORAL
COMMUNITY
Start: 2024-09-16

## 2025-01-09 NOTE — PROGRESS NOTES
Kiara Kimble, APRN  Eva Kaba  1955 01/09/2025    Patient Active Problem List   Diagnosis    Pulmonary embolism    Pulmonary nodules    Seasonal allergies    SVT (supraventricular tachycardia)    Abnormal CT scan    Abscess of axilla    COVID-19    Essential hypertension       Dear Kiara Kimble, APRN:    Subjective     History of Present Illness:    Chief Complaint   Patient presents with    Follow-up     routine       Eva Kaba is a pleasant 69 y.o. female with a past medical history significant for essential hypertension, PSVT, and remote history of pulmonary embolism (he has been unable to afford Eliquis). She comes in today for cardiology follow-up.   History of Present Illness  The patient presents for a follow-up evaluation of hypertension, supraventricular tachycardia (SVT), and a remote history of pulmonary embolism.    The patient's blood pressure is well-controlled outside of the clinical setting. She denies any new health concerns, including chest pain, dyspnea, syncope, or falls. There are no symptoms indicative of a thrombotic event, such as acute dyspnea or lower extremity edema.    No recent laboratory tests have been conducted.       No Known Allergies:      Current Outpatient Medications:     albuterol (PROVENTIL HFA;VENTOLIN HFA) 108 (90 BASE) MCG/ACT inhaler, Inhale 2 puffs Every 4 (Four) Hours As Needed for Wheezing., Disp: , Rfl:     aspirin 325 MG tablet, Take 1 tablet by mouth Daily., Disp: , Rfl:     cetirizine (zyrTEC) 5 MG tablet, Take 1 tablet by mouth Daily., Disp: , Rfl:     cyclobenzaprine (FLEXERIL) 5 MG tablet, Take 1 tablet by mouth 3 (Three) Times a Day As Needed., Disp: , Rfl:     fluticasone (FLONASE) 50 MCG/ACT nasal spray, 2 sprays into each nostril Daily. Administer 2 sprays in each nostril for each dose., Disp: 1 bottle, Rfl: 2    guaiFENesin (MUCINEX) 600 MG 12 hr tablet, Mucus Relief  mg tablet, extended release  TAKE ONE TABLET BY MOUTH TWO TIMES A DAY FOR  SINUS CONGESTION, Disp: , Rfl:     hydroCHLOROthiazide 25 MG tablet, TAKE ONE TABLET BY MOUTH EVERY DAY FOR FLUID RETENTION, Disp: 90 tablet, Rfl: 0    HYDROcodone-acetaminophen (NORCO) 5-325 MG per tablet, , Disp: , Rfl:     ibuprofen (ADVIL,MOTRIN) 800 MG tablet, TAKE ONE TABLET BY MOUTH THREE TIMES A DAY WITH FOOD FOR PAIN OR INFLAMMATION, Disp: , Rfl:     lisinopril (PRINIVIL,ZESTRIL) 20 MG tablet, TAKE ONE TABLET BY MOUTH EVERY DAY FOR BLOOD PRESSURE, Disp: 90 tablet, Rfl: 0    meclizine (ANTIVERT) 25 MG tablet, meclizine 25 mg tablet  TAKE ONE TABLET BY MOUTH TWO TIMES A DAY FOR DIZZINESS, Disp: , Rfl:     metoprolol tartrate (LOPRESSOR) 25 MG tablet, TAKE ONE TABLET BY MOUTH TWO TIMES PER DAY FOR BLOOD PRESSURE, Disp: 180 tablet, Rfl: 0    multivitamin (THERAGRAN) tablet tablet, Take  by mouth Daily., Disp: , Rfl:     nitroglycerin (NITROSTAT) 0.4 MG SL tablet, Place 1 tablets sublingual at the 1st sign of chest pain, wait 5 minutes and repeat if chest pain persists.  If 3 tablets are needed seek emergency medical attention., Disp: , Rfl:     ofloxacin (OCUFLOX) 0.3 % ophthalmic solution, , Disp: , Rfl:     ondansetron (ZOFRAN) 4 MG tablet, ondansetron HCl 4 mg tablet  TAKE 1 TO 2 TABLETS BY MOUTH THREE TIMES A DAY FOR NAUSEA & VOMITING, Disp: , Rfl:     phenazopyridine (PYRIDIUM) 100 MG tablet, Take 1 tablet 3 times a day by oral route for 2 days., Disp: , Rfl:     prednisoLONE acetate (PRED FORTE) 1 % ophthalmic suspension, , Disp: , Rfl:     vitamin C (ASCORBIC ACID) 250 MG tablet, Take 1 tablet by mouth Daily., Disp: , Rfl:     Vitamin D, Cholecalciferol, (CHOLECALCIFEROL) 10 MCG (400 UNIT) tablet, Take 1 tablet by mouth Daily., Disp: , Rfl:     Cranberry-Vitamin C-Probiotic (AZO Cranberry) 250-30 MG tablet, Take  by mouth. (Patient not taking: Reported on 1/9/2025), Disp: , Rfl:     The following portions of the patient's history were reviewed and updated as appropriate: allergies, current medications,  "past family history, past medical history, past social history, past surgical history and problem list.    Social History     Tobacco Use    Smoking status: Former     Current packs/day: 1.00     Types: Cigarettes     Passive exposure: Past    Smokeless tobacco: Never    Tobacco comments:     8 months   Vaping Use    Vaping status: Never Used   Substance Use Topics    Alcohol use: No    Drug use: No         Objective   Vitals:    01/09/25 0935   BP: 115/66   Pulse: 62   SpO2: 97%   Weight: 79.2 kg (174 lb 9.6 oz)   Height: 167.6 cm (66\")     Body mass index is 28.18 kg/m².    ROS    Constitutional:       General: Not in acute distress.     Appearance: Healthy appearance. Well-developed and not in distress. Not diaphoretic.   Eyes:      Conjunctiva/sclera: Conjunctivae normal.      Pupils: Pupils are equal, round, and reactive to light.   HENT:      Head: Normocephalic and atraumatic.   Neck:      Vascular: No carotid bruit or JVD.   Pulmonary:      Effort: Pulmonary effort is normal. No respiratory distress.      Breath sounds: Normal breath sounds.   Cardiovascular:      Normal rate. Regular rhythm.   Edema:     Peripheral edema absent.   Skin:     General: Skin is cool.   Neurological:      Mental Status: Alert, oriented to person, place, and time and oriented to person, place and time.         Lab Results   Component Value Date     06/16/2023    K 3.9 06/16/2023     06/16/2023    CO2 26.2 06/16/2023    BUN 14 06/16/2023    CREATININE 0.74 06/16/2023    GLUCOSE 175 (H) 06/16/2023    CALCIUM 9.4 06/16/2023    AST 16 06/16/2023    ALT 17 06/16/2023    ALKPHOS 69 06/16/2023    LABIL2 1.5 05/23/2016     Lab Results   Component Value Date    CKTOTAL 160 06/16/2023     Lab Results   Component Value Date    WBC 8.47 06/16/2023    HGB 13.9 06/16/2023    HCT 43.5 06/16/2023     06/16/2023     Lab Results   Component Value Date    INR 0.94 05/23/2016    INR 0.94 03/07/2016    INR 0.96 02/27/2016     Lab " Results   Component Value Date    MG 2.0 06/16/2023     Lab Results   Component Value Date    TSH 1.560 06/16/2023      Lab Results   Component Value Date    BNP 88 03/07/2016       During this visit the following were done:  Labs Reviewed []    Labs Ordered []    Radiology Reports Reviewed []    Radiology Ordered []    PCP Records Reviewed []    Referring Provider Records Reviewed []    ER Records Reviewed []    Hospital Records Reviewed []    History Obtained From Family []    Radiology Images Reviewed []    Other Reviewed []    Records Requested []         ECG 12 Lead    Date/Time: 1/9/2025 10:27 AM  Performed by: Tu Garcia PA-C    Authorized by: Tu Garcia PA-C  Comparison: compared with previous ECG   Similar to previous ECG  Rhythm: sinus rhythm  Conduction: conduction normal    Clinical impression: non-specific ECG          Assessment & Plan    Diagnosis Plan   1. Essential hypertension  Comprehensive Metabolic Panel    Lipid Panel    CBC (No Diff)    TSH+Free T4    ECG 12 Lead      2. SVT (supraventricular tachycardia)  Comprehensive Metabolic Panel    Lipid Panel    CBC (No Diff)    TSH+Free T4    ECG 12 Lead               Assessment & Plan  1. Hypertension  - Blood pressure is well controlled  - Continue current regimen    2. Dyslipidemia  - Check lipid panel  - Tailor therapy based on results    3. Health Maintenance  - Order routine labs (CBC, CMP, TSH) to check blood count, kidneys, electrolytes, liver, thyroid, and cholesterol  - Tests can be done at [place]'s office      Return in about 1 year (around 1/9/2026).    As always, I appreciate very much the opportunity to participate in the cardiovascular care of your patients.      With Best Regards,    Tu Garcia PA-C    Patient or patient representative verbalized consent for the use of Ambient Listening during the visit with  Tu Garcia PA-C for chart documentation. 1/9/2025  12:57 EST

## 2025-01-13 ENCOUNTER — LAB (OUTPATIENT)
Dept: LAB | Facility: HOSPITAL | Age: 70
End: 2025-01-13
Payer: MEDICARE

## 2025-01-13 DIAGNOSIS — I47.10 SVT (SUPRAVENTRICULAR TACHYCARDIA): ICD-10-CM

## 2025-01-13 DIAGNOSIS — I10 ESSENTIAL HYPERTENSION: ICD-10-CM

## 2025-01-13 PROCEDURE — 84439 ASSAY OF FREE THYROXINE: CPT

## 2025-01-13 PROCEDURE — 80053 COMPREHEN METABOLIC PANEL: CPT

## 2025-01-13 PROCEDURE — 85027 COMPLETE CBC AUTOMATED: CPT

## 2025-01-13 PROCEDURE — 80061 LIPID PANEL: CPT

## 2025-01-13 PROCEDURE — 36415 COLL VENOUS BLD VENIPUNCTURE: CPT

## 2025-01-13 PROCEDURE — 84443 ASSAY THYROID STIM HORMONE: CPT

## 2025-01-14 LAB
ALBUMIN SERPL-MCNC: 3.9 G/DL (ref 3.5–5.2)
ALBUMIN/GLOB SERPL: 1.6 G/DL
ALP SERPL-CCNC: 61 U/L (ref 39–117)
ALT SERPL W P-5'-P-CCNC: 14 U/L (ref 1–33)
ANION GAP SERPL CALCULATED.3IONS-SCNC: 9 MMOL/L (ref 5–15)
AST SERPL-CCNC: 18 U/L (ref 1–32)
BILIRUB SERPL-MCNC: 0.3 MG/DL (ref 0–1.2)
BUN SERPL-MCNC: 16 MG/DL (ref 8–23)
BUN/CREAT SERPL: 22.9 (ref 7–25)
CALCIUM SPEC-SCNC: 9.4 MG/DL (ref 8.6–10.5)
CHLORIDE SERPL-SCNC: 103 MMOL/L (ref 98–107)
CHOLEST SERPL-MCNC: 207 MG/DL (ref 0–200)
CO2 SERPL-SCNC: 27 MMOL/L (ref 22–29)
CREAT SERPL-MCNC: 0.7 MG/DL (ref 0.57–1)
DEPRECATED RDW RBC AUTO: 37.3 FL (ref 37–54)
EGFRCR SERPLBLD CKD-EPI 2021: 93.8 ML/MIN/1.73
ERYTHROCYTE [DISTWIDTH] IN BLOOD BY AUTOMATED COUNT: 11.7 % (ref 12.3–15.4)
GLOBULIN UR ELPH-MCNC: 2.5 GM/DL
GLUCOSE SERPL-MCNC: 89 MG/DL (ref 65–99)
HCT VFR BLD AUTO: 42.2 % (ref 34–46.6)
HDLC SERPL-MCNC: 39 MG/DL (ref 40–60)
HGB BLD-MCNC: 14 G/DL (ref 12–15.9)
LDLC SERPL CALC-MCNC: 134 MG/DL (ref 0–100)
LDLC/HDLC SERPL: 3.33 {RATIO}
MCH RBC QN AUTO: 29 PG (ref 26.6–33)
MCHC RBC AUTO-ENTMCNC: 33.2 G/DL (ref 31.5–35.7)
MCV RBC AUTO: 87.4 FL (ref 79–97)
PLATELET # BLD AUTO: 196 10*3/MM3 (ref 140–450)
PMV BLD AUTO: 11.7 FL (ref 6–12)
POTASSIUM SERPL-SCNC: 3.8 MMOL/L (ref 3.5–5.2)
PROT SERPL-MCNC: 6.4 G/DL (ref 6–8.5)
RBC # BLD AUTO: 4.83 10*6/MM3 (ref 3.77–5.28)
SODIUM SERPL-SCNC: 139 MMOL/L (ref 136–145)
T4 FREE SERPL-MCNC: 0.91 NG/DL (ref 0.92–1.68)
TRIGL SERPL-MCNC: 190 MG/DL (ref 0–150)
TSH SERPL DL<=0.05 MIU/L-ACNC: 1.02 UIU/ML (ref 0.27–4.2)
VLDLC SERPL-MCNC: 34 MG/DL (ref 5–40)
WBC NRBC COR # BLD AUTO: 8.71 10*3/MM3 (ref 3.4–10.8)

## 2025-04-28 DIAGNOSIS — I47.10 SVT (SUPRAVENTRICULAR TACHYCARDIA): ICD-10-CM

## 2025-04-28 DIAGNOSIS — I10 ESSENTIAL HYPERTENSION: ICD-10-CM

## 2025-04-28 RX ORDER — HYDROCHLOROTHIAZIDE 25 MG/1
TABLET ORAL
Qty: 90 TABLET | Refills: 0 | Status: SHIPPED | OUTPATIENT
Start: 2025-04-28

## 2025-04-28 RX ORDER — LISINOPRIL 20 MG/1
20 TABLET ORAL DAILY
Qty: 90 TABLET | Refills: 0 | Status: SHIPPED | OUTPATIENT
Start: 2025-04-28

## 2025-04-28 RX ORDER — METOPROLOL TARTRATE 25 MG/1
25 TABLET, FILM COATED ORAL 2 TIMES DAILY
Qty: 180 TABLET | Refills: 0 | Status: SHIPPED | OUTPATIENT
Start: 2025-04-28

## 2025-05-30 ENCOUNTER — TRANSCRIBE ORDERS (OUTPATIENT)
Dept: ADMINISTRATIVE | Facility: HOSPITAL | Age: 70
End: 2025-05-30
Payer: MEDICARE

## 2025-05-30 DIAGNOSIS — Z12.31 ENCOUNTER FOR SCREENING MAMMOGRAM FOR MALIGNANT NEOPLASM OF BREAST: Primary | ICD-10-CM

## 2025-06-11 ENCOUNTER — HOSPITAL ENCOUNTER (OUTPATIENT)
Dept: MAMMOGRAPHY | Facility: HOSPITAL | Age: 70
Discharge: HOME | End: 2025-06-11
Admitting: PHYSICIAN ASSISTANT
Payer: MEDICARE

## 2025-06-11 DIAGNOSIS — Z12.31 ENCOUNTER FOR SCREENING MAMMOGRAM FOR MALIGNANT NEOPLASM OF BREAST: ICD-10-CM

## 2025-06-11 PROCEDURE — 77063 BREAST TOMOSYNTHESIS BI: CPT

## 2025-06-11 PROCEDURE — 77067 SCR MAMMO BI INCL CAD: CPT

## 2025-08-04 ENCOUNTER — HOSPITAL ENCOUNTER (OUTPATIENT)
Dept: ULTRASOUND IMAGING | Facility: HOSPITAL | Age: 70
Discharge: HOME | End: 2025-08-04
Admitting: RADIOLOGY
Payer: MEDICARE

## 2025-08-04 DIAGNOSIS — R92.8 ABNORMAL MAMMOGRAM: ICD-10-CM

## 2025-08-04 PROCEDURE — 76642 ULTRASOUND BREAST LIMITED: CPT | Performed by: RADIOLOGY

## 2025-08-04 PROCEDURE — 76642 ULTRASOUND BREAST LIMITED: CPT

## 2025-08-21 DIAGNOSIS — I47.10 SVT (SUPRAVENTRICULAR TACHYCARDIA): ICD-10-CM

## 2025-08-21 RX ORDER — LISINOPRIL 20 MG/1
20 TABLET ORAL DAILY
Qty: 90 TABLET | Refills: 0 | Status: SHIPPED | OUTPATIENT
Start: 2025-08-21